# Patient Record
Sex: MALE | Race: ASIAN | NOT HISPANIC OR LATINO | Employment: FULL TIME | URBAN - METROPOLITAN AREA
[De-identification: names, ages, dates, MRNs, and addresses within clinical notes are randomized per-mention and may not be internally consistent; named-entity substitution may affect disease eponyms.]

---

## 2017-03-01 ENCOUNTER — PRE VISIT (OUTPATIENT)
Dept: PULMONOLOGY | Facility: CLINIC | Age: 47
End: 2017-03-01

## 2017-03-01 NOTE — TELEPHONE ENCOUNTER
1.  Date/reason for appt: 3/13/17- Allergies     2.  Referring provider: Self     3.  Call to patient (Yes / No - short description): Yes, LM to call back. Any outside records related?

## 2017-03-01 NOTE — TELEPHONE ENCOUNTER
Received call back from patient. Stated no outside records related to allergies. This is the first time being seen for it.     Closing encounter.

## 2017-03-07 ENCOUNTER — CARE COORDINATION (OUTPATIENT)
Dept: PULMONOLOGY | Facility: CLINIC | Age: 47
End: 2017-03-07

## 2017-03-07 NOTE — PROGRESS NOTES
Writer contacted patient to remind him to hold any antihistamines prior to next week's appointment with Dr. Wilson. Patient verbalized understanding of this information and stated he does not take any antihistamines currently.

## 2017-03-13 ENCOUNTER — OFFICE VISIT (OUTPATIENT)
Dept: PULMONOLOGY | Facility: CLINIC | Age: 47
End: 2017-03-13
Attending: ALLERGY & IMMUNOLOGY
Payer: COMMERCIAL

## 2017-03-13 VITALS — OXYGEN SATURATION: 98 % | DIASTOLIC BLOOD PRESSURE: 87 MMHG | HEART RATE: 66 BPM | SYSTOLIC BLOOD PRESSURE: 133 MMHG

## 2017-03-13 DIAGNOSIS — J30.1 SEASONAL ALLERGIC RHINITIS DUE TO POLLEN: Primary | ICD-10-CM

## 2017-03-13 DIAGNOSIS — J30.81 NON-SEASONAL ALLERGIC RHINITIS DUE TO ANIMAL HAIR AND DANDER: ICD-10-CM

## 2017-03-13 PROCEDURE — 95018 ALL TSTG PERQ&IQ DRUGS/BIOL: CPT | Mod: ZF

## 2017-03-13 PROCEDURE — 99212 OFFICE O/P EST SF 10 MIN: CPT | Mod: ZF

## 2017-03-13 PROCEDURE — 95004 PERQ TESTS W/ALRGNC XTRCS: CPT | Performed by: ALLERGY & IMMUNOLOGY

## 2017-03-13 PROCEDURE — 95024 IQ TESTS W/ALLERGENIC XTRCS: CPT | Performed by: ALLERGY & IMMUNOLOGY

## 2017-03-13 RX ORDER — FLUTICASONE PROPIONATE 50 MCG
2 SPRAY, SUSPENSION (ML) NASAL DAILY
Qty: 1 BOTTLE | Refills: 3 | Status: SHIPPED | OUTPATIENT
Start: 2017-03-13 | End: 2021-11-18

## 2017-03-13 RX ORDER — OLOPATADINE HYDROCHLORIDE 2 MG/ML
1 SOLUTION/ DROPS OPHTHALMIC DAILY
Qty: 2.5 ML | Refills: 3 | Status: SHIPPED | OUTPATIENT
Start: 2017-03-13 | End: 2021-07-06

## 2017-03-13 ASSESSMENT — PAIN SCALES - GENERAL: PAINLEVEL: NO PAIN (0)

## 2017-03-13 NOTE — LETTER
3/13/2017       RE: Ricky Varela  [address]  [facility] [address]     Dear Colleague,    Thank you for referring your patient, Ricky Varela, to the [facility] at [facility]. Please see a copy of my visit note below.    Reason for Visit  Ricky Varela is a 46 year old male who is referred by Carlos Azevedo for conjunctivitis.    Allergy HPI  HISTORY OF PRESENT ILLNESS:  Ricky presents today for initial consultation.  Main concern is constantly itchy eyes bilaterally.  He said it is extremely irritating for the last few months.  He has tried Claritin-D, does not seem to help very much.  He has not tried any eyedrops, except for he did think maybe it was infected and he used antibiotic eyedrops and thinks maybe it helped.  In the spring and fall, he has mild sneezing and nasal congestion which he used Claritin-D with benefit.  He, of note, is a  and he works around mice and rats.  He also did get a dog in November.  He does not feel like the dog is a trigger but the timing is line.      SOCIAL HISTORY:  He is not a smoker.        FAMILY HISTORY:  No allergies.         The patient was seen and examined by Dwight Das MD   Current Outpatient Prescriptions   Medication     gemfibrozil (LOPID) 600 MG tablet     omega-3 acid ethyl esters (LOVAZA) 1 G capsule     ATORVASTATIN CALCIUM PO     No current facility-administered medications for this visit.      Allergies   Allergen Reactions     Atorvastatin Muscle Pain (Myalgia)     Ragweeds Other (See Comments)     rhinitis     Social History     Social History     Marital status:      Spouse name: N/A     Number of children: N/A     Years of education: N/A     Occupational History     Not on file.     Social History Main Topics     Smoking status: Never Smoker     Smokeless tobacco: Never Used     Alcohol use No     Drug use: No     Sexual activity: Not on file      Other Topics Concern     Not on file     Social History Narrative    Lives in [facility], with wife and children.   2007, two children (2008, 09).  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          From [place], immigrated in 1986.  Family here.  5 siblings     Past Medical History   Diagnosis Date     Alopecia areata      recurrent     Hyperlipidemia LDL goal < 160      Past Surgical History   Procedure Laterality Date     Vasectomy       Circumcision  7/8/2014     Procedure: CIRCUMCISION;  Surgeon: Tomas Francisco MD;  Location: [facility]     Colonoscopy  2015     NORMAL, repeat in 2020     Family History   Problem Relation Age of Onset     DIABETES Mother      Cancer - colorectal Father 64     DIABETES Brother          ROS   A complete ROS was otherwise negative except as noted in the HPI and the end of the note.  /87 (BP Location: Right arm, Patient Position: Chair, Cuff Size: Adult Regular)  Pulse 66  SpO2 98%  Exam:   GENERAL APPEARANCE: Well developed, well nourished, alert, and in no apparent distress.  EYES: PERRL, EOMI, conjunctiva clear non-injected.  Redness around the eyelids.  HENT: Nasal mucosa with mild edema and no discharge. No nasal polyps.    EARS: Canals clear, TMs normal  MOUTH: Oral mucosa is moist, without any lesions, no tonsillar enlargement, no oropharyngeal exudate.  LYMPHATICS: No significant cervical, or supraclavicular nodes.  RESP: Good air flow throughout.  No crackles. No rhonchi. No wheezes.  CV: Normal S1, S2, regular rhythm, normal rate. No murmur.  No rub. No gallop. No LE edema.   MS: Extremities normal. No clubbing. No cyanosis.  SKIN: No rashes noted  NEURO: Speech normal, normal strength and tone, normal gait and stance  PSYCH: Normal mentation, orientation to person, place, and time.  Results:  39 percutaneous environmental allergen extracts placed by MA and read by MD.  High positive to tree and weed pollen and mild to grass pollen.    3  intradermal allergen extracts placed and positive to dog.        Assessment and plan: ASSESSMENT AND PLAN:   Ricky presented today for an initial consultation regarding concern of mostly itchy eyes.  He also has symptoms in the spring and fall.  IgE mediated skin testing did show he does have a positive sensitivity to dog and this is in line with his symptoms being mostly noted the time the dog started.  They cannot get rid of the dog, so we discussed control measures and I recommended him using Pataday eyedrops but if he cannot get this for insurance reasons, he will get Zaditor over-the-counter.  Because of the spring and fall, will use Flonase.  We might be able to stop this after the fall and 10 mg cetirizine in the spring and fall, may need to use this more frequently depending on how he does with the eyedrops as well.  Asked him to follow up in about 3 months and then we will discuss desensitization.  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       Again, thank you for allowing me to participate in the care of your patient.      Sincerely,    Dwight Das MD

## 2017-03-13 NOTE — MR AVS SNAPSHOT
After Visit Summary   3/13/2017    Ricky Varela    MRN: [id number]           Patient Information     Date Of Birth          1970        Visit Information        Provider Department      3/13/2017 8:15 AM Dwight Wilson MD [facility]        Today's Diagnoses     Seasonal allergic rhinitis due to pollen    -  1      Care Instructions    Zaditor eye drop 1 drop in each eye twice a day if Pataday is very expensive.    Fluticasone (flonase) nasal spray 2 sprays in each nostril.    10 mg zyrtec (ceterizine) can be used in the spring and fall.      Animal Allergy  (FELd1)    The number of pets in the [place] is estimated at more than 150 million. This large number also increases the likelihood of accidental exposure to animals by the allergic patient when visiting homes and public places.    Ways to reduce reactions/complications:  If you can keep the dog out of the bedroom get a HEPA air filter in the bedroom    The ideal situation for an allergic reaction would be to not have any pet at all. However, many pet owners feel strongly about their pets, and would rather remove the allergic individual from the home than the pet! A pet such as a dog or cat should at least be kept out of the patient's bedroom. Recent studies have shown that bathing a cat or dog once a week can decrease significantly the amount of the allergen that the allergic patient is exposed to. The avid pet owner may claim that exposure to his or her pet does not cause them any problem. This however, should be viewed skeptically since pet ownership is an emotionally charged subject. Also, many allergic pet owners are rarely away from their pets, so an accurate reporting of pet related symptoms may not be possible. The best test to confirm if a person is allergic to a pet is removal from the home for several weeks and a thorough cleaning done by the non-allergic individual to remove the hair  "and dander. It should be understood that it could take weeks for meticulous cleaning to remove all the animal hair and dander before a change in the allergic patient is noticed. A frequently mistaken idea is that shorthaired animals cause fewer problems. It is the dander (flakes of skin) that causes the most significant allergic reactions, not the length of the hair on the pet. Allergens are also found in the pet's saliva and urine. In addition, long haired animals that are outside can then bring outdoors pollens inside and exposing allergic individuals.    Indoor pets should be restricted to a few rooms in the home if possible. Isolating the pet to one room however, will not limit the allergens to that room. Air currents from forced-air heating and air conditioning will spread the allergens throughout the house. Homes with forced-air heating and/or air conditioning may be fitted with a central air . This may remove significant amounts of pet allergens from the home. The air  should be used at least four hours per day.     Best Pets for Allergenic Individuals:    The best types of pets for allergic patients are tropical fish, lizards, turtles, salamanders and certain types of frogs and tortoises. All of these pets do not have hair, fur or dander nor does their excrement create allergic problems. However, patients should keep in mind that large aquariums can add water vapor in a room, thus increasing mold and house dust mite concentrations in their home.         *All information has been reviewed, updated and approved by:  Dr. Dwight Wilson-[facility] at [facility] updated: 11/2016         Pollen Control Measures      * Keep windows and doors shut and run air conditioner at all times. This keeps outdoor air outside.    * Keep windows closed while in the car and air conditioner on the \"re-circulate\" mode.    * Wash your hair before going to bed at night to reduce exposure during " "sleep.    * Keep pets outdoors to prevent the pollen from being brought in on their hair.    * Avoid hanging clothes and linens outside as pollens may collect on the items.     * Don't mow the lawn if you are allergic to grass or weeds. If you must mow the grass, wear a face mask.       Spring: Tree Pollen    Summer: Grass Pollen and Mold    Fall: Weed Pollen and Mold      *All information has been reviewed, updated and approved by:  Dr. Dwight Wilson-[place] [facility] at [facility] updated: 11/2016                Follow-ups after your visit        Follow-up notes from your care team     Return in about 3 months (around 6/13/2017).      Who to contact     If you have questions or need follow up information about today's clinic visit or your schedule please contact [facility] directly at [phone].  Normal or non-critical lab and imaging results will be communicated to you by [employer], letter or phone within 4 business days after the clinic has received the results. If you do not hear from us within 7 days, please contact the clinic through [employer] or phone. If you have a critical or abnormal lab result, we will notify you by phone as soon as possible.  Submit refill requests through [employer] or call your pharmacy and they will forward the refill request to us. Please allow 3 business days for your refill to be completed.          Additional Information About Your Visit        [employer] Information     [employer] lets you send messages to your doctor, view your test results, renew your prescriptions, schedule appointments and more. To sign up, go to [email or web address] . Click on \"Log in\" on the left side of the screen, which will take you to the Welcome page. Then click on \"Sign up Now\" on the right side of the page.     You will be asked to enter the access code listed below, as well as some personal information. Please follow the directions to create your username " and password.     Your access code is: [email or web address]  Expires: 2017  9:37 AM     Your access code will  in 90 days. If you need help or a new code, please call your [facility] or [phone].        Care EveryWhere ID     This is your Care EveryWhere ID. This could be used by other organizations to access your [place] medical records  [id number]        Your Vitals Were     Pulse Pulse Oximetry                66 98%           Blood Pressure from Last 3 Encounters:   17 133/87   16 113/80   16 116/79    Weight from Last 3 Encounters:   16 66.8 kg (147 lb 4.8 oz)   16 68.9 kg (152 lb)   09/24/15 67.1 kg (148 lb)              Today, you had the following     No orders found for display         Today's Medication Changes          These changes are accurate as of: 3/13/17 10:08 AM.  If you have any questions, ask your nurse or doctor.               Start taking these medicines.        Dose/Directions    fluticasone 50 MCG/ACT spray   Commonly known as:  FLONASE   Used for:  Seasonal allergic rhinitis due to pollen   Started by:  Dwight Wilson MD        Dose:  2 spray   Spray 2 sprays into both nostrils daily   Quantity:  1 Bottle   Refills:  3       olopatadine HCl 0.2 % Soln   Commonly known as:  PATADAY   Used for:  Seasonal allergic rhinitis due to pollen   Started by:  Dwight Wilson MD        Dose:  1 drop   Place 1 drop into both eyes daily   Quantity:  2.5 mL   Refills:  3            Where to get your medicines      These medications were sent to [place] [address] [place] [facility] [address]  [address] [facility] [address]     Phone:  [phone]     fluticasone 50 MCG/ACT spray    olopatadine HCl 0.2 % Soln                Primary Care Provider Office Phone # Fax #    Carlos Azevedo [phone]       [facility] [address] [facility] [address]        Thank you!     Thank you for  [facility]  for your care. Our goal is always to provide you with excellent care. Hearing back from our patients is one way we can continue to improve our services. Please take a few minutes to complete the written survey that you may receive in the mail after your visit with us. Thank you!             Your Updated Medication List - Protect others around you: Learn how to safely use, store and throw away your medicines at www.disposemymeds.org.          This list is accurate as of: 3/13/17 10:08 AM.  Always use your most recent med list.                   Brand Name Dispense Instructions for use    ATORVASTATIN CALCIUM PO          fluticasone 50 MCG/ACT spray    FLONASE    1 Bottle    Spray 2 sprays into both nostrils daily       gemfibrozil 600 MG tablet    LOPID    182 tablet    Take 1 tablet (600 mg) by mouth 2 times daily (before meals) , OR as directed by Dr. Mendieta.       olopatadine HCl 0.2 % Soln    PATADAY    2.5 mL    Place 1 drop into both eyes daily       omega-3 acid ethyl esters 1 G capsule    Lovaza    182 capsule    Take 1 capsule (1 g) by mouth 2 times daily

## 2017-03-13 NOTE — NURSING NOTE
Chief Complaint   Patient presents with     Allergy Consult     Patient is vincenzo seen for consultation of allergies      Joana Alegre CMA at 8:33 AM on 3/13/2017

## 2017-03-13 NOTE — PATIENT INSTRUCTIONS
Zaditor eye drop 1 drop in each eye twice a day if Pataday is very expensive.    Fluticasone (flonase) nasal spray 2 sprays in each nostril.    10 mg zyrtec (ceterizine) can be used in the spring and fall.      Animal Allergy  (FELd1)    The number of pets in the [place] is estimated at more than 150 million. This large number also increases the likelihood of accidental exposure to animals by the allergic patient when visiting homes and public places.    Ways to reduce reactions/complications:  If you can keep the dog out of the bedroom get a HEPA air filter in the bedroom    The ideal situation for an allergic reaction would be to not have any pet at all. However, many pet owners feel strongly about their pets, and would rather remove the allergic individual from the home than the pet! A pet such as a dog or cat should at least be kept out of the patient's bedroom. Recent studies have shown that bathing a cat or dog once a week can decrease significantly the amount of the allergen that the allergic patient is exposed to. The avid pet owner may claim that exposure to his or her pet does not cause them any problem. This however, should be viewed skeptically since pet ownership is an emotionally charged subject. Also, many allergic pet owners are rarely away from their pets, so an accurate reporting of pet related symptoms may not be possible. The best test to confirm if a person is allergic to a pet is removal from the home for several weeks and a thorough cleaning done by the non-allergic individual to remove the hair and dander. It should be understood that it could take weeks for meticulous cleaning to remove all the animal hair and dander before a change in the allergic patient is noticed. A frequently mistaken idea is that shorthaired animals cause fewer problems. It is the dander (flakes of skin) that causes the most significant allergic reactions, not the length of the hair on the pet. Allergens are also  "found in the pet's saliva and urine. In addition, long haired animals that are outside can then bring outdoors pollens inside and exposing allergic individuals.    Indoor pets should be restricted to a few rooms in the home if possible. Isolating the pet to one room however, will not limit the allergens to that room. Air currents from forced-air heating and air conditioning will spread the allergens throughout the house. Homes with forced-air heating and/or air conditioning may be fitted with a central air . This may remove significant amounts of pet allergens from the home. The air  should be used at least four hours per day.     Best Pets for Allergenic Individuals:    The best types of pets for allergic patients are tropical fish, lizards, turtles, salamanders and certain types of frogs and tortoises. All of these pets do not have hair, fur or dander nor does their excrement create allergic problems. However, patients should keep in mind that large aquariums can add water vapor in a room, thus increasing mold and house dust mite concentrations in their home.         *All information has been reviewed, updated and approved by:  Dr. Dwight Wilson-[facility] at [facility] updated: 11/2016         Pollen Control Measures      * Keep windows and doors shut and run air conditioner at all times. This keeps outdoor air outside.    * Keep windows closed while in the car and air conditioner on the \"re-circulate\" mode.    * Wash your hair before going to bed at night to reduce exposure during sleep.    * Keep pets outdoors to prevent the pollen from being brought in on their hair.    * Avoid hanging clothes and linens outside as pollens may collect on the items.     * Don't mow the lawn if you are allergic to grass or weeds. If you must mow the grass, wear a face mask.       Spring: Tree Pollen    Summer: Grass Pollen and Mold    Fall: Weed Pollen and Mold      *All information has been " reviewed, updated and approved by:  Dr. Dwight Wilson-[facility] at [facility] updated: 11/2016

## 2017-03-13 NOTE — PROGRESS NOTES
Reason for Visit  Ricky Varela is a 46 year old male who is referred by Carlos Azevedo for conjunctivitis.    Allergy HPI  HISTORY OF PRESENT ILLNESS:  Ricky presents today for initial consultation.  Main concern is constantly itchy eyes bilaterally.  He said it is extremely irritating for the last few months.  He has tried Claritin-D, does not seem to help very much.  He has not tried any eyedrops, except for he did think maybe it was infected and he used antibiotic eyedrops and thinks maybe it helped.  In the spring and fall, he has mild sneezing and nasal congestion which he used Claritin-D with benefit.  He, of note, is a  and he works around mice and rats.  He also did get a dog in November.  He does not feel like the dog is a trigger but the timing is line.      SOCIAL HISTORY:  He is not a smoker.        FAMILY HISTORY:  No allergies.         The patient was seen and examined by Dwight Das MD   Current Outpatient Prescriptions   Medication     gemfibrozil (LOPID) 600 MG tablet     omega-3 acid ethyl esters (LOVAZA) 1 G capsule     ATORVASTATIN CALCIUM PO     No current facility-administered medications for this visit.      Allergies   Allergen Reactions     Atorvastatin Muscle Pain (Myalgia)     Ragweeds Other (See Comments)     rhinitis     Social History     Social History     Marital status:      Spouse name: N/A     Number of children: N/A     Years of education: N/A     Occupational History     Not on file.     Social History Main Topics     Smoking status: Never Smoker     Smokeless tobacco: Never Used     Alcohol use No     Drug use: No     Sexual activity: Not on file     Other Topics Concern     Not on file     Social History Narrative    Lives in [facility], with wife and children.   2007, two children (2008, 09).  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          From [place], immigrated in 1986.  Family here.  5 siblings     Past Medical History    Diagnosis Date     Alopecia areata      recurrent     Hyperlipidemia LDL goal < 160      Past Surgical History   Procedure Laterality Date     Vasectomy       Circumcision  7/8/2014     Procedure: CIRCUMCISION;  Surgeon: Tomas Francisco MD;  Location: [facility]     Colonoscopy  2015     NORMAL, repeat in 2020     Family History   Problem Relation Age of Onset     DIABETES Mother      Cancer - colorectal Father 64     DIABETES Brother          ROS   A complete ROS was otherwise negative except as noted in the HPI and the end of the note.  /87 (BP Location: Right arm, Patient Position: Chair, Cuff Size: Adult Regular)  Pulse 66  SpO2 98%  Exam:   GENERAL APPEARANCE: Well developed, well nourished, alert, and in no apparent distress.  EYES: PERRL, EOMI, conjunctiva clear non-injected.  Redness around the eyelids.  HENT: Nasal mucosa with mild edema and no discharge. No nasal polyps.    EARS: Canals clear, TMs normal  MOUTH: Oral mucosa is moist, without any lesions, no tonsillar enlargement, no oropharyngeal exudate.  LYMPHATICS: No significant cervical, or supraclavicular nodes.  RESP: Good air flow throughout.  No crackles. No rhonchi. No wheezes.  CV: Normal S1, S2, regular rhythm, normal rate. No murmur.  No rub. No gallop. No LE edema.   MS: Extremities normal. No clubbing. No cyanosis.  SKIN: No rashes noted  NEURO: Speech normal, normal strength and tone, normal gait and stance  PSYCH: Normal mentation, orientation to person, place, and time.  Results:  39 percutaneous environmental allergen extracts placed by MA and read by MD.  High positive to tree and weed pollen and mild to grass pollen.    3 intradermal allergen extracts placed and positive to dog.        Assessment and plan: ASSESSMENT AND PLAN:   Ricky presented today for an initial consultation regarding concern of mostly itchy eyes.  He also has symptoms in the spring and fall.  IgE mediated skin testing did show he does have a positive  sensitivity to dog and this is in line with his symptoms being mostly noted the time the dog started.  They cannot get rid of the dog, so we discussed control measures and I recommended him using Pataday eyedrops but if he cannot get this for insurance reasons, he will get Zaditor over-the-counter.  Because of the spring and fall, will use Flonase.  We might be able to stop this after the fall and 10 mg cetirizine in the spring and fall, may need to use this more frequently depending on how he does with the eyedrops as well.  Asked him to follow up in about 3 months and then we will discuss desensitization.  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

## 2017-05-02 ENCOUNTER — OFFICE VISIT (OUTPATIENT)
Dept: FAMILY MEDICINE | Facility: CLINIC | Age: 47
End: 2017-05-02

## 2017-05-02 VITALS
HEIGHT: 65 IN | RESPIRATION RATE: 16 BRPM | HEART RATE: 90 BPM | WEIGHT: 148 LBS | BODY MASS INDEX: 24.66 KG/M2 | DIASTOLIC BLOOD PRESSURE: 79 MMHG | SYSTOLIC BLOOD PRESSURE: 119 MMHG

## 2017-05-02 DIAGNOSIS — E78.00 HIGH BLOOD CHOLESTEROL: ICD-10-CM

## 2017-05-02 DIAGNOSIS — E78.00 HIGH BLOOD CHOLESTEROL: Primary | ICD-10-CM

## 2017-05-02 LAB
ALBUMIN SERPL-MCNC: 3.9 G/DL (ref 3.4–5)
ALP SERPL-CCNC: 61 U/L (ref 40–150)
ALT SERPL W P-5'-P-CCNC: 42 U/L (ref 0–70)
ANION GAP SERPL CALCULATED.3IONS-SCNC: 7 MMOL/L (ref 3–14)
AST SERPL W P-5'-P-CCNC: 29 U/L (ref 0–45)
BILIRUB SERPL-MCNC: 0.4 MG/DL (ref 0.2–1.3)
BUN SERPL-MCNC: 17 MG/DL (ref 7–30)
CALCIUM SERPL-MCNC: 9 MG/DL (ref 8.5–10.1)
CHLORIDE SERPL-SCNC: 107 MMOL/L (ref 94–109)
CHOLEST SERPL-MCNC: 226 MG/DL
CO2 SERPL-SCNC: 27 MMOL/L (ref 20–32)
CREAT SERPL-MCNC: 1.02 MG/DL (ref 0.66–1.25)
GFR SERPL CREATININE-BSD FRML MDRD: 78 ML/MIN/1.7M2
GLUCOSE SERPL-MCNC: 94 MG/DL (ref 70–99)
HDLC SERPL-MCNC: 41 MG/DL
LDLC SERPL CALC-MCNC: 141 MG/DL
NONHDLC SERPL-MCNC: 185 MG/DL
POTASSIUM SERPL-SCNC: 4.7 MMOL/L (ref 3.4–5.3)
PROT SERPL-MCNC: 7.6 G/DL (ref 6.8–8.8)
SODIUM SERPL-SCNC: 141 MMOL/L (ref 133–144)
TRIGL SERPL-MCNC: 219 MG/DL

## 2017-05-02 ASSESSMENT — PAIN SCALES - GENERAL: PAINLEVEL: MILD PAIN (3)

## 2017-05-02 NOTE — PATIENT INSTRUCTIONS
[facility] Care Center Medication Refill Request Information:  * Please contact your pharmacy regarding ANY request for medication refills.  ** [facility] Prescription Fax = [phone]  * Please allow 3 business days for routine medication refills.  * Please allow 5 business days for controlled substance medication refills.     [facility] Care Center Test Result notification information:  *You will be notified within 7-10 days of your appointment day regarding the results of your test.  If you are on MyChart you will be notified as soon as the provider has reviewed the results and signed off on them.

## 2017-05-02 NOTE — PROGRESS NOTES
SUBJECTIVE:    Pt is a 46 year old male with pmh of     Patient Active Problem List   Diagnosis     Lumbago     Hyperlipidemia with target LDL less than 160       who is here for evaluation of had concerns including Physical.     Here for physical. Overall doing well. Takes low dose Lipitor. Tries to eat healthy, exercise routinely. 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    Notes right sinus discomfort and plugged right ear last few weeks, started after trip w/ kids to the [place].     Gets seasonal allergies, also notes cannot tolerate Sudafed. On Flonase, daily, about two weeks, so far no improvement.    Rash, comes goes, base of neck. Red, slightly itchy, occasionally elsewhere, not open, warm, fluid filled, draining, tender. Slight raised non flaky patches.    Allergies   Allergen Reactions     Atorvastatin Muscle Pain (Myalgia)     Ragweeds Other (See Comments)     rhinitis         Past Medical History:   Diagnosis Date     Alopecia areata     recurrent     Hyperlipidemia LDL goal < 160      Past Surgical History:   Procedure Laterality Date     CIRCUMCISION  7/8/2014    Procedure: CIRCUMCISION;  Surgeon: Tomas Francisco MD;  Location: [facility] OR     COLONOSCOPY  2015    NORMAL, repeat in 2020     VASECTOMY       Family History   Problem Relation Age of Onset     DIABETES Mother      Cancer - colorectal Father 64     DIABETES Brother      Allergies   Allergen Reactions     Atorvastatin Muscle Pain (Myalgia)     Ragweeds Other (See Comments)     rhinitis           Current Outpatient Prescriptions   Medication Sig Dispense Refill     ATORVASTATIN CALCIUM PO        olopatadine HCl (PATADAY) 0.2 % SOLN Place 1 drop into both eyes daily 2.5 mL 3     fluticasone (FLONASE) 50 MCG/ACT spray Spray 2 sprays into both nostrils daily 1 Bottle 3     gemfibrozil (LOPID) 600 MG tablet Take 1 tablet (600 mg) by mouth 2 times daily (before meals) , OR as directed by Dr. Mendieta. 182 tablet 3  "    omega-3 acid ethyl esters (LOVAZA) 1 G capsule Take 1 capsule (1 g) by mouth 2 times daily 182 capsule 3       Social History   Substance Use Topics     Smoking status: Never Smoker     Smokeless tobacco: Never Used     Alcohol use No         OBJECTIVE:  /79  Pulse 90  Resp 16  Ht 1.654 m (5' 5.12\")  Wt 67.1 kg (148 lb)  BMI 24.54 kg/m2  GENERAL APPEARANCE: Alert, no acute distress  EYES: PERRL, EOM normal, conjunctiva and lids normal  HENT: Ears and TMs normal, oral mucosa and posterior oropharynx normal  NECK: No adenopathy,masses or thyromegaly  RESP: lungs clear to auscultation   CV: normal rate, regular rhythm, no murmur or gallop  ABDOMEN: soft, no organomegaly, masses or tenderness   (male): penis, scrotum, testes normal, no hernias  LYMPHATICS: No cervical, supraclavicular or inguinal adenopathy  MS: extremities normal, no peripheral edema  SKIN: Bilat base of neck laterally one inch red slight raised patch, not flaky, not warm/tender, no open/fluid filled  NEURO: Alert, oriented, speech and mentation normal  PSYCHE: mentation appears normal, affect and mood normal    ASSESSMENT/PLAN:    Rash: possible eczema, he's using OTC lotion, could try hct OTC he knows  High chol; labs done, report sent to him, cont Lipitor from heart clinic  Seasonal allergies, likely right ETD too: cont Flonase, try Claritin and phenylephrine, f/u prn; recent allergy clinic note rvwd      RUSTY LOYD MD    "

## 2017-05-02 NOTE — NURSING NOTE
Chief Complaint   Patient presents with     Physical     Pt is here for a physical.      Jaja Edmonds LPN May 2, 2017 9:25 AM

## 2017-05-02 NOTE — MR AVS SNAPSHOT
After Visit Summary   5/2/2017    Ricky Varela    MRN: [id number]           Patient Information     Date Of Birth          1970        Visit Information        Provider Department      5/2/2017 9:30 AM Rajendra Orozco MD [facility]        Today's Diagnoses     High blood cholesterol    -  1      Care Instructions    Primary Care Center Medication Refill Request Information:  * Please contact your pharmacy regarding ANY request for medication refills.  ** [facility] Prescription Fax = [phone]  * Please allow 3 business days for routine medication refills.  * Please allow 5 business days for controlled substance medication refills.     [facility] Center Test Result notification information:  *You will be notified within 7-10 days of your appointment day regarding the results of your test.  If you are on MyChart you will be notified as soon as the provider has reviewed the results and signed off on them.          Follow-ups after your visit        Your next 10 appointments already scheduled     May 02, 2017 10:30 AM CDT   LAB with [facility] ([facility])    [address] [place] [id number] [phone]           Patient must bring picture ID.  Patient should be prepared to give a urine specimen  Please do not eat 10-12 hours before your appointment if you are coming in fasting for labs on lipids, cholesterol, or glucose (sugar).  Pregnant women should follow their Care Team instructions. Water with medications is okay. Do not drink coffee or other fluids.   If you have concerns about taking  your medications, please ask at office or if scheduling via [place] [employer], send a message by clicking on Secure Messaging, Message Your Care Team.              Future tests that were ordered for you today     Open Future Orders        Priority Expected Expires Ordered    Lipid panel reflex to direct LDL Routine 5/2/2017 5/16/2017  "2017    Comprehensive metabolic panel Routine 2017            Who to contact     Please call your clinic at [phone] to:    Ask questions about your health    Make or cancel appointments    Discuss your medicines    Learn about your test results    Speak to your doctor   If you have compliments or concerns about an experience at your clinic, or if you wish to file a complaint, please contact [facility] Patient Relations at [phone] or email us at [email or web address]         Additional Information About Your Visit        FilmMe Information     FilmMe is an electronic gateway that provides easy, online access to your medical records. With FilmMe, you can request a clinic appointment, read your test results, renew a prescription or communicate with your care team.     To sign up for FilmMe visit the website at [email or web address]   You will be asked to enter the access code listed below, as well as some personal information. Please follow the directions to create your username and password.     Your access code is: [email or web address]  Expires: 2017  9:37 AM     Your access code will  in 90 days. If you need help or a new code, please contact your [facility] Clinic or call [phone] for assistance.        Care EveryWhere ID     This is your Care EveryWhere ID. This could be used by other organizations to access your [facility] medical records  [id number]        Your Vitals Were     Pulse Respirations Height BMI (Body Mass Index)          90 16 1.654 m (5' 5.12\") 24.54 kg/m2         Blood Pressure from Last 3 Encounters:   17 119/79   17 133/87   16 113/80    Weight from Last 3 Encounters:   17 67.1 kg (148 lb)   16 66.8 kg (147 lb 4.8 oz)   16 68.9 kg (152 lb)               Primary Care Provider Office Phone # Fax #    Carlos Azevedo [phone]    "    [facility]  [facility] [address]        Thank you!     Thank you for choosing [facility]  for your care. Our goal is always to provide you with excellent care. Hearing back from our patients is one way we can continue to improve our services. Please take a few minutes to complete the written survey that you may receive in the mail after your visit with us. Thank you!             Your Updated Medication List - Protect others around you: Learn how to safely use, store and throw away your medicines at www.disposemymeds.org.          This list is accurate as of: 5/2/17 10:22 AM.  Always use your most recent med list.                   Brand Name Dispense Instructions for use    ATORVASTATIN CALCIUM PO          fluticasone 50 MCG/ACT spray    FLONASE    1 Bottle    Spray 2 sprays into both nostrils daily       gemfibrozil 600 MG tablet    LOPID    182 tablet    Take 1 tablet (600 mg) by mouth 2 times daily (before meals) , OR as directed by Dr. Mendieta.       olopatadine HCl 0.2 % Soln    PATADAY    2.5 mL    Place 1 drop into both eyes daily       omega-3 acid ethyl esters 1 G capsule    Lovaza    182 capsule    Take 1 capsule (1 g) by mouth 2 times daily

## 2020-11-08 ENCOUNTER — HEALTH MAINTENANCE LETTER (OUTPATIENT)
Age: 50
End: 2020-11-08

## 2020-12-09 ENCOUNTER — VIRTUAL VISIT (OUTPATIENT)
Dept: PSYCHOLOGY | Facility: CLINIC | Age: 50
End: 2020-12-09
Payer: COMMERCIAL

## 2020-12-09 DIAGNOSIS — F43.22 ADJUSTMENT DISORDER WITH ANXIETY: Primary | ICD-10-CM

## 2020-12-09 PROCEDURE — 90847 FAMILY PSYTX W/PT 50 MIN: CPT | Mod: 95 | Performed by: PSYCHOLOGIST

## 2020-12-09 ASSESSMENT — COLUMBIA-SUICIDE SEVERITY RATING SCALE - C-SSRS
TOTAL  NUMBER OF INTERRUPTED ATTEMPTS PAST 3 MONTHS: NO
1. IN THE PAST MONTH, HAVE YOU WISHED YOU WERE DEAD OR WISHED YOU COULD GO TO SLEEP AND NOT WAKE UP?: NO
2. HAVE YOU ACTUALLY HAD ANY THOUGHTS OF KILLING YOURSELF?: NO
ATTEMPT PAST THREE MONTHS: NO
1. IN THE PAST MONTH, HAVE YOU WISHED YOU WERE DEAD OR WISHED YOU COULD GO TO SLEEP AND NOT WAKE UP?: NO
4. HAVE YOU HAD THESE THOUGHTS AND HAD SOME INTENTION OF ACTING ON THEM?: NO
2. HAVE YOU ACTUALLY HAD ANY THOUGHTS OF KILLING YOURSELF LIFETIME?: NO
TOTAL  NUMBER OF INTERRUPTED ATTEMPTS LIFETIME: NO
5. HAVE YOU STARTED TO WORK OUT OR WORKED OUT THE DETAILS OF HOW TO KILL YOURSELF? DO YOU INTEND TO CARRY OUT THIS PLAN?: NO
TOTAL  NUMBER OF ABORTED OR SELF INTERRUPTED ATTEMPTS PAST 3 MONTHS: NO
4. HAVE YOU HAD THESE THOUGHTS AND HAD SOME INTENTION OF ACTING ON THEM?: NO
5. HAVE YOU STARTED TO WORK OUT OR WORKED OUT THE DETAILS OF HOW TO KILL YOURSELF? DO YOU INTEND TO CARRY OUT THIS PLAN?: NO
6. HAVE YOU EVER DONE ANYTHING, STARTED TO DO ANYTHING, OR PREPARED TO DO ANYTHING TO END YOUR LIFE?: NO
ATTEMPT LIFETIME: NO
6. HAVE YOU EVER DONE ANYTHING, STARTED TO DO ANYTHING, OR PREPARED TO DO ANYTHING TO END YOUR LIFE?: NO
TOTAL  NUMBER OF ABORTED OR SELF INTERRUPTED ATTEMPTS PAST LIFETIME: NO
3. HAVE YOU BEEN THINKING ABOUT HOW YOU MIGHT KILL YOURSELF?: NO

## 2020-12-09 NOTE — PROGRESS NOTES
"                 Progress Note - Initial Visit    Client Name:  Ricky Varela Date: 12/9/2020       Service Type: Family with client present     Visit Start Time: 10:35  Visit End Time: 11:25    Visit #: 1    Attendees: Client and Spouse / Significant Other    Service Modality:  Phone Visit:    The patient has been notified of the following:      \"We have found that certain health care needs can be provided without the need for a face to face visit.  This service lets us provide the care you need with a phone conversation.       I will have full access to your [place] medical record during this entire phone call.   I will be taking notes for your medical record.      Since this is like an office visit, we will bill your insurance company for this service.       There are potential benefits and risks of telephone visits (e.g. limits to patient confidentiality) that differ from in-person visits.?  Confidentiality still applies for telephone services, and nobody will record the visit.  It is important to be in a quiet, private space that is free of distractions (including cell phone or other devices) during the visit.??      If during the course of the call I believe a telephone visit is not appropriate, you will not be charged for this service\"     Consent has been obtained for this service by care team member: Yes        DATA:   Interactive Complexity: No   Crisis: No     Presenting Concerns/  Current Stressors:   Lack of intimacy      ASSESSMENT:  Mental Status Assessment:  Appearance:   not visible. on phone   Eye Contact:   not visible. on phone   Psychomotor Behavior: visible. on phone    Attitude:   Cooperative  Interested Friendly Pleasant  Orientation:   All  Speech   Rate / Production: Normal/ Responsive   Volume:  Normal   Mood:    discouraged  Affect:    Appropriate   Thought Content:  Clear   Thought Form:  Coherent  Logical   Insight:    Good       Safety Issues and Plan for Safety and Risk " Management:     [place] Suicide Severity Rating Scale (Lifetime/Recent)  [place] Suicide Severity Rating (Lifetime/Recent) 12/9/2020   1. Wish to be Dead (Lifetime) No   1. Wish to be Dead (Recent) No   2. Non-Specific Active Suicidal Thoughts (Lifetime) No   2. Non-Specific Active Suicidal Thoughts (Recent) No   3. Active Suicidal Ideation with any Methods (Not Plan) Without Intent to Act (Lifetime) No   3. Active Sucidal Ideation with any Methods (Not Plan) Without Intent to Act (Recent) No   4. Active Suicidal Ideation with Some Intent to Act, Without Specific Plan (Lifetime) No   4. Active Suicidal Ideation with Some Intent to Act, Without Specific Plan (Recent) No   5. Active Suicidal Ideation with Specific Plan and Intent (Lifetime) No   5. Active Suicidal Ideation with Specific Plan and Intent (Recent) No   Most Severe Ideation Rating (Lifetime) NA   Frequency (Lifetime) NA   Duration (Lifetime) NA   Controllability (Lifetime) NA   Protective Factors  (Lifetime) NA   Reasons for Ideation (Lifetime) NA   Most Severe Ideation Rating (Past Month) NA   Frequency (Past Month) NA   Duration (Past Month) NA   Controllability (Past Month) NA   Protective Factors (Past Month) NA   Reasons for Ideation (Past Month) NA   Actual Attempt (Lifetime) No   Actual Attempt (Past 3 Months) No   Has subject engaged in non-suicidal self-injurious behavior? (Lifetime) No   Has subject engaged in non-suicidal self-injurious behavior? (Past 3 Months) No   Interrupted Attempts (Lifetime) No   Interrupted Attempts (Past 3 Months) No   Aborted or Self-Interrupted Attempt (Lifetime) No   Aborted or Self-Interrupted Attempt (Past 3 Months) No   Preparatory Acts or Behavior (Lifetime) No   Preparatory Acts or Behavior (Past 3 Months) No   Most Recent Attempt Actual Lethality Code NA   Most Lethal Attempt Actual Lethality Code NA   Initial/First Attempt Actual Lethality Code NA     Patient denies current fears or concerns for personal  safety.  Patient denies current or recent suicidal ideation or behaviors.  Patient denies current or recent homicidal ideation or behaviors.  Patient denies current or recent self injurious behavior or ideation.  Patient denies other safety concerns.  Recommended that patient call 911 or go to the local ED should there be a change in any of these risk factors.  Patient reports there are no firearms in the house.     Diagnostic Criteria:  A. The development of emotional or behavioral symptoms in response to an identifiable stressor(s) occurring within 3 months of the onset of the stressor(s)  B. These symptoms or behaviors are clinically significant, as evidenced by one or both of the following:  C. The stress-related disturbance does not meet criteria for another disorder & is not not an exacerbation of another mental disorder  D. The symptoms do not represent normal bereavement  E. Once the stressor or its consequences have terminated, the symptoms do not persist for more than an additional 6 months       * Adjustment Disorder with Anxiety: The predominant manfestations are symptoms such as nervousness, worry, or jitteriness, or, in children separation anxiety from major attachment figures      DSM5 Diagnoses: (Sustained by DSM5 Criteria Listed Above)  Diagnoses: Adjustment Disorders  309.24 (F43.22) With anxiety  Psychosocial & Contextual Factors: lack of intimacy, covid  WHODAS 2.0 (12 item):   WHODAS 2.0 Total Score 12/2/2020   Total Score MyChart 15   Some encounter information is confidential and restricted. Go to Review Flowsheets activity to see all data.     Intervention:   DBT- Patient was educated on the Interpersonal Effectiveness Skill How to build connection, MI-  Patient was educated on following motivational interviewing skill   and Educated on treatment planning and started identifying goals and interventions for treatment plan  Collateral Reports Completed:  Routed note to PCP      PLAN: (Homework,  other):  1. Provider will continue Diagnostic Assessment.  Patient was given the following to do until next session:  Have date nights. List of what to do and what the other did. Read Sarabjit donohue    2. Provider recommended the following referrals: Sarabjit donohue.      3.  Safety plan created.  Provider recommended that patient - not needed       Juana Flores, WAI  December 9, 2020

## 2020-12-16 ENCOUNTER — VIRTUAL VISIT (OUTPATIENT)
Dept: PSYCHOLOGY | Facility: CLINIC | Age: 50
End: 2020-12-16
Payer: COMMERCIAL

## 2020-12-16 DIAGNOSIS — F43.22 ADJUSTMENT DISORDER WITH ANXIETY: Primary | ICD-10-CM

## 2020-12-16 PROCEDURE — 90791 PSYCH DIAGNOSTIC EVALUATION: CPT | Mod: 95 | Performed by: PSYCHOLOGIST

## 2021-01-04 ENCOUNTER — VIRTUAL VISIT (OUTPATIENT)
Dept: PSYCHOLOGY | Facility: CLINIC | Age: 51
End: 2021-01-04
Payer: COMMERCIAL

## 2021-01-04 DIAGNOSIS — F43.22 ADJUSTMENT DISORDER WITH ANXIETY: Primary | ICD-10-CM

## 2021-01-04 PROCEDURE — 90847 FAMILY PSYTX W/PT 50 MIN: CPT | Mod: 95 | Performed by: PSYCHOLOGIST

## 2021-01-04 NOTE — PROGRESS NOTES
"                                           Progress Note    Patient Name: Ricky Varela  Date: 1/4/2021       Service Type: Family with client present      Session Start Time: 9:03  Session End Time: 9:55     Session Length: 52 min    Session #: 3    Attendees: Client and Spouse / Significant Other    Service Modality:  Phone Visit:    The patient has been notified of the following:      \"We have found that certain health care needs can be provided without the need for a face to face visit.  This service lets us provide the care you need with a phone conversation.       I will have full access to your [place] medical record during this entire phone call.   I will be taking notes for your medical record.      Since this is like an office visit, we will bill your insurance company for this service.       There are potential benefits and risks of telephone visits (e.g. limits to patient confidentiality) that differ from in-person visits.?  Confidentiality still applies for telephone services, and nobody will record the visit.  It is important to be in a quiet, private space that is free of distractions (including cell phone or other devices) during the visit.??      If during the course of the call I believe a telephone visit is not appropriate, you will not be charged for this service\"     Consent has been obtained for this service by care team member: Yes      Treatment Plan Last Reviewed: today  PHQ-9 / MARILEE-7 : 12/09/20    DATA  Interactive Complexity: No  Crisis: No       Progress Since Last Session (Related to Symptoms / Goals / Homework):   Symptoms: Improving more kindness and hugs    Homework: Partially completed  not 10 min touch      Episode of Care Goals: Satisfactory progress - ACTION (Actively working towards change); Intervened by reinforcing change plan / affirming steps taken     Current / Ongoing Stressors and Concerns:   Daughter suicidal and gender questioning   Marriage stress and disconnect   Covid " stress     Treatment Objective(s) Addressed in This Session:   participate in affection activities to improve mood  use relaxation strategies 2 times per day to reduce the physical symptoms of anxiety  track and record at least weekly pleasant exchanges with wife     Intervention:   CBT: reduce awkwardness  Emotion Focused Therapy: build physical connection and appreciations  Interpersonal Therapy: had then touch hands and face  Solution Focused: How to handle daughter's suicidal thoughts and gender problems        ASSESSMENT: Current Emotional / Mental Status (status of significant symptoms):   Risk status (Self / Other harm or suicidal ideation)   Patient denies current fears or concerns for personal safety.   Patient denies current or recent suicidal ideation or behaviors.   Patient denies current or recent homicidal ideation or behaviors.   Patient denies current or recent self injurious behavior or ideation.   Patient denies other safety concerns.   Patient reports there has been no change in risk factors since their last session.     Patient reports there has been no change in protective factors since their last session.     Recommended that patient call 911 or go to the local ED should there be a change in any of these risk factors.     Appearance:                            not visible. on phone   Eye Contact:                           not visible. on phone   Psychomotor Behavior:          visible. on phone    Attitude:                                   Cooperative  Interested Friendly Pleasant  Orientation:                             All  Speech              Rate / Production:       Normal/ Responsive              Volume:                       Normal   Mood:                                      discouraged  Affect:                                      Appropriate   Thought Content:                    Clear   Thought Form:                        Coherent  Logical   Insight:                                      Good      Medication Review:   No current psychiatric medications prescribed     Medication Compliance:   NA     Changes in Health Issues:   None reported     Chemical Use Review:   Substance Use: Chemical use reviewed, no active concerns identified      Tobacco Use: No current tobacco use.      Diagnosis:  1. Adjustment disorder with anxiety      Collateral Reports Completed:   Not Applicable    PLAN: (Patient Tasks / Therapist Tasks / Other)  10 min of touch and tenderness.  Spontaneous hugs and read the book: Sarabjit Flores, LP                                                         ______________________________________________________________________    Treatment Plan    Patient's Name: Ricky Varela  YOB: 1970    Date: 1/4/2021    DSM5 Diagnoses:  Adjustment Disorders  309.24 (F43.22) With anxiety  Psychosocial & Contextual Factors: lack of intimacy, covid  WHODAS 2.0 (12 item):   WHODAS 2.0 Total Score 12/2/2020   Total Score MyChart 15       Referral / Collaboration:  Referral to another professional/service is not indicated at this time..    Anticipated number of session or this episode of care: 10      MeasurableTreatment Goal(s) related to diagnosis / functional impairment(s)  Goal 1: Patient will report more closeness and less stress.    I will know I've met my goal when their is more connection.      Objective #A (Patient Action)    Patient will participate in affection activities to improve mood  use relaxation strategies 2 times per day to reduce the physical symptoms of anxiety  Increase interest, engagement, and pleasure in doing things  track and record at least 2 pleasant exchanges with wife.  Status: New - Date: 1/4/21     Intervention(s)  Therapist will assign homework affection trading times  provide facilitate closeness.      Goal 2: Patient will less stressed with daughter's suicidal thoughts and gender stress.    I will know I've met my goal when know how to  "support my daughter.      Objective #A (Patient Action)    Status: New - Date: 1/4/21     Patient will identify 2 strategies to more effectively address stressors  use \"worry time\" each day for 15 minutes of scheduled worry and then defer obsessive or anxious thinking until the next structured worry time  track and record at least 2 pleasant exchanges with daughter.    Intervention(s)  Therapist will assign homework how to support daughter  role-play communication with daughter.    Patient has reviewed and agreed to the above plan.      Juana Flores, WAI  January 4, 2021      "

## 2021-01-13 ENCOUNTER — VIRTUAL VISIT (OUTPATIENT)
Dept: PSYCHOLOGY | Facility: CLINIC | Age: 51
End: 2021-01-13
Payer: COMMERCIAL

## 2021-01-13 DIAGNOSIS — F43.22 ADJUSTMENT DISORDER WITH ANXIETY: Primary | ICD-10-CM

## 2021-01-13 PROCEDURE — 90834 PSYTX W PT 45 MINUTES: CPT | Mod: 95 | Performed by: PSYCHOLOGIST

## 2021-01-13 NOTE — PROGRESS NOTES
"                                           Progress Note    Patient Name: Ricky Varela  Date: 1/13/2021     Service Type: Individual      Session Start Time: 8:32  Session End Time: 9:24     Session Length: 52 min    Session #: 4    Attendees: Client and Spouse / Significant Other    Service Modality:  Phone Visit:      Provider verified identity through the following two step process.  Patient provided:  Patient photo    The patient has been notified of the following:      \"We have found that certain health care needs can be provided without the need for a face to face visit.  This service lets us provide the care you need with a phone conversation.       I will have full access to your [place] medical record during this entire phone call.   I will be taking notes for your medical record.      Since this is like an office visit, we will bill your insurance company for this service.       There are potential benefits and risks of telephone visits (e.g. limits to patient confidentiality) that differ from in-person visits.?  Confidentiality still applies for telephone services, and nobody will record the visit.  It is important to be in a quiet, private space that is free of distractions (including cell phone or other devices) during the visit.??      If during the course of the call I believe a telephone visit is not appropriate, you will not be charged for this service\"     Consent has been obtained for this service by care team member: Yes      Treatment Plan Last Reviewed: 1/4/21  PHQ-9 / MARILEE-7 : 12/09/20    DATA  Interactive Complexity: No  Crisis: No       Progress Since Last Session (Related to Symptoms / Goals / Homework):   Symptoms: Worsening Ricky reports his attempts to connect only happened once. Is discouraged    Homework: Partially completed      Episode of Care Goals: Minimal progress - ACTION (Actively working towards change); Intervened by reinforcing change plan / affirming steps taken     Current / " Ongoing Stressors and Concerns:   Daughter suicidal and gender questioning              Marriage stress and disconnect              Covid stress     Treatment Objective(s) Addressed in This Session:   participate in building positive relationship activities to improve mood  identify at least 2 techniques for intervening on the escalation  initiate physical social contact(s) per day for increasing lengths of time  Decrease frequency and intensity of feeling down, depressed, hopeless  Identify negative self-talk and behaviors: challenge core beliefs, myths, and actions  track and record at least weekly pleasant exchanges with wife  Disagreements and money conflict     Intervention:   CBT: reduce expectations of no problems  Emotion Focused Therapy: how to connect emotionally  Interpersonal Therapy: communication                ASSESSMENT: Current Emotional / Mental Status (status of significant symptoms):              Risk status (Self / Other harm or suicidal ideation)              Patient denies current fears or concerns for personal safety.              Patient denies current or recent suicidal ideation or behaviors.              Patient denies current or recent homicidal ideation or behaviors.              Patient denies current or recent self injurious behavior or ideation.              Patient denies other safety concerns.              Patient reports there has been no change in risk factors since their last session.                Patient reports there has been no change in protective factors since their last session.                Recommended that patient call 911 or go to the local ED should there be a change in any of these risk factors.                 Appearance:                            not visible. on phone   Eye Contact:                           not visible. on phone   Psychomotor Behavior:          visible. on phone    Attitude:                                   Cooperative  Interested Friendly  Pleasant  Orientation:                             All  Speech              Rate / Production:       Normal/ Responsive              Volume:                       Normal   Mood:                                      discouraged  Affect:                                      Appropriate   Thought Content:                    Clear   Thought Form:                        Coherent  Logical   Insight:                                     Good                  Medication Review:              No current psychiatric medications prescribed                 Medication Compliance:              NA                 Changes in Health Issues:              None reported                 Chemical Use Review:              Substance Use: Chemical use reviewed, no active concerns identified                  Tobacco Use: No current tobacco use.       Diagnosis:  1. Adjustment disorder with anxiety       Collateral Reports Completed:              Not Applicable     PLAN: (Patient Tasks / Therapist Tasks / Other)  10 min of touch and tenderness.  Spontaneous hugs and read the book: Sarabjit Flores, LP                                                           ______________________________________________________________________     Treatment Plan     Patient's Name: Ricky Varela                     YOB: 1970     Date: 1/4/2021     DSM5 Diagnoses:  Adjustment Disorders  309.24 (F43.22) With anxiety  Psychosocial & Contextual Factors: lack of intimacy, covid  WHODAS 2.0 (12 item):   WHODAS 2.0 Total Score 12/2/2020   Total Score MyChart 15         Referral / Collaboration:  Referral to another professional/service is not indicated at this time.     Anticipated number of session or this episode of care: 10        MeasurableTreatment Goal(s) related to diagnosis / functional impairment(s)  Goal 1: Patient will report more closeness and less stress.    I will know I've met my goal when their is more connection.   "     Objective #A (Patient Action)                          Patient will participate in affection activities to improve mood  use relaxation strategies 2 times per day to reduce the physical symptoms of anxiety  Increase interest, engagement, and pleasure in doing things  track and record at least 2 pleasant exchanges with wife.  Status: New - Date: 1/4/21      Intervention(s)  Therapist will assign homework affection trading times  provide facilitate closeness.        Goal 2: Patient will less stressed with daughter's suicidal thoughts and gender stress.    I will know I've met my goal when know how to support my daughter.       Objective #A (Patient Action)                          Status: New - Date: 1/4/21      Patient will identify 2 strategies to more effectively address stressors  use \"worry time\" each day for 15 minutes of scheduled worry and then defer obsessive or anxious thinking until the next structured worry time  track and record at least 2 pleasant exchanges with daughter.     Intervention(s)  Therapist will assign homework how to support daughter  role-play communication with daughter.     Patient has reviewed and agreed to the above plan.                January 4, 2021    Juana Flores LP  January 13, 2021    "

## 2021-01-27 ENCOUNTER — VIRTUAL VISIT (OUTPATIENT)
Dept: PSYCHOLOGY | Facility: CLINIC | Age: 51
End: 2021-01-27
Payer: COMMERCIAL

## 2021-01-27 DIAGNOSIS — F43.22 ADJUSTMENT DISORDER WITH ANXIETY: Primary | ICD-10-CM

## 2021-01-27 PROCEDURE — 90847 FAMILY PSYTX W/PT 50 MIN: CPT | Mod: 95 | Performed by: PSYCHOLOGIST

## 2021-01-27 NOTE — PROGRESS NOTES
Progress Note    Patient Name: Ricky Varela  Date: 1/27/2021       Service Type: Individual      Session Start Time: 8:30  Session End Time: 9:22     Session Length: 52    Session #: 5    Attendees:   Client and Spouse / Significant Other     Service Modality: VideoVisit:      Telemedicine Visit: The patient's condition can be safely assessed and treated via synchronous audio and visual telemedicine encounter.      Reason for Telemedicine Visit: Services only offered telehealth    Originating Site (Patient Location): Patient's place of employment    Distant Site (Provider Location): Provider's home office    Consent:  The patient/guardian has verbally consented to: the potential risks and benefits of telemedicine (video visit) versus in person care; bill my insurance or make self-payment for services provided; and responsibility for payment of non-covered services.     Mode of Communication:  Video Conference via [employer]    As the provider I attest to compliance with applicable laws and regulations related to telemedicine.                  Treatment Plan Last Reviewed: 1/4/21  PHQ-9 / MARILEE-7 : 12/09/20     DATA  Interactive Complexity: No  Crisis: No           Progress Since Last Session (Related to Symptoms / Goals / Homework):   Symptoms: Improving more closeness and spending time together    Homework: Partially completed  no touch time yet      Episode of Care Goals: Minimal progress - ACTION (Actively working towards change); Intervened by reinforcing change plan / affirming steps taken     Current / Ongoing Stressors and Concerns:   Daughter suicidal and gender questioning              Marriage stress and disconnect              Covid stress   House renovation     Treatment Objective(s) Addressed in This Session:   participate in more positive communication  activities to improve mood  use relaxation strategies 2 times per day to reduce the physical symptoms of  anxiety  learn & utilize at least meal and physical closeness assertive communication skills weekly     Intervention:   CBT: communication and creating trust in relationship  Emotion Focused Therapy: emotional support  Interpersonal Therapy: building comfortable communication and decision making forum                ASSESSMENT: Current Emotional / Mental Status (status of significant symptoms):              Risk status (Self / Other harm or suicidal ideation)              Patient denies current fears or concerns for personal safety.              Patient denies current or recent suicidal ideation or behaviors.              Patient denies current or recent homicidal ideation or behaviors.              Patient denies current or recent self injurious behavior or ideation.              Patient denies other safety concerns.              Patient reports there has been no change in risk factors since their last session.                Patient reports there has been no change in protective factors since their last session.                Recommended that patient call 911 or go to the local ED should there be a change in any of these risk factors.                 Appearance:                            not visible. on phone   Eye Contact:                           not visible. on phone   Psychomotor Behavior:          visible. on phone    Attitude:                                   Cooperative  Interested Friendly Pleasant  Orientation:                             All  Speech              Rate / Production:       Normal/ Responsive              Volume:                       Normal   Mood:                                      discouraged  Affect:                                      Appropriate   Thought Content:                    Clear   Thought Form:                        Coherent  Logical   Insight:                                     Good                  Medication Review:              No current psychiatric medications  prescribed                 Medication Compliance:              NA                 Changes in Health Issues:              None reported                 Chemical Use Review:              Substance Use: Chemical use reviewed, no active concerns identified                  Tobacco Use: No current tobacco use.       Diagnosis:  1. Adjustment disorder with anxiety       Collateral Reports Completed:              Not Applicable     PLAN: (Patient Tasks / Therapist Tasks / Other)  Sit together and build decision making forum. 10 min of touch and tenderness.  Spontaneous hugs and read the book: Sarabjit Flores, LP                                                           ______________________________________________________________________     Treatment Plan     Patient's Name: Ricky Varela                     YOB: 1970     Date: 1/4/2021     DSM5 Diagnoses:  Adjustment Disorders  309.24 (F43.22) With anxiety  Psychosocial & Contextual Factors: lack of intimacy, covid  WHODAS 2.0 (12 item):   WHODAS 2.0 Total Score 12/2/2020   Total Score MyChart 15         Referral / Collaboration:  Referral to another professional/service is not indicated at this time.     Anticipated number of session or this episode of care: 10        MeasurableTreatment Goal(s) related to diagnosis / functional impairment(s)  Goal 1: Patient will report more closeness and less stress.    I will know I've met my goal when their is more connection.       Objective #A (Patient Action)                          Patient will participate in affection activities to improve mood  use relaxation strategies 2 times per day to reduce the physical symptoms of anxiety  Increase interest, engagement, and pleasure in doing things  track and record at least 2 pleasant exchanges with wife.  Status: New - Date: 1/4/21      Intervention(s)  Therapist will assign homework affection trading times  provide facilitate  "closeness.        Goal 2: Patient will less stressed with daughter's suicidal thoughts and gender stress.    I will know I've met my goal when know how to support my daughter.       Objective #A (Patient Action)                          Status: New - Date: 1/4/21      Patient will identify 2 strategies to more effectively address stressors  use \"worry time\" each day for 15 minutes of scheduled worry and then defer obsessive or anxious thinking until the next structured worry time  track and record at least 2 pleasant exchanges with daughter.     Intervention(s)  Therapist will assign homework how to support daughter  role-play communication with daughter.     Patient has reviewed and agreed to the above plan.                January 4, 2021      Juana Flores LP  January 27, 2021    "

## 2021-02-17 ENCOUNTER — VIRTUAL VISIT (OUTPATIENT)
Dept: PSYCHOLOGY | Facility: CLINIC | Age: 51
End: 2021-02-17
Payer: COMMERCIAL

## 2021-02-17 DIAGNOSIS — F43.22 ADJUSTMENT DISORDER WITH ANXIETY: Primary | ICD-10-CM

## 2021-02-17 PROCEDURE — 90834 PSYTX W PT 45 MINUTES: CPT | Mod: 95 | Performed by: PSYCHOLOGIST

## 2021-02-17 NOTE — PROGRESS NOTES
Progress Note    Patient Name: Ricky Varela  Date: 2/17/2021       Service Type: Individual      Session Start Time: 8:30  Session End Time: 9:22     Session Length: 52 min    Session #: 6    Attendees:  Client and Spouse / Significant Other     Service Modality: VideoVisit:      Telemedicine Visit: The patient's condition can be safely assessed and treated via synchronous audio and visual telemedicine encounter.       Reason for Telemedicine Visit: Services only offered telehealth     Originating Site (Patient Location): Patient's place of employment     Distant Site (Provider Location): Provider's home office     Consent:  The patient/guardian has verbally consented to: the potential risks and benefits of telemedicine (video visit) versus in person care; bill my insurance or make self-payment for services provided; and responsibility for payment of non-covered services.      Mode of Communication:  Video Conference via [employer]     As the provider I attest to compliance with applicable laws and regulations related to telemedicine.                   Treatment Plan Last Reviewed: 1/4/21  PHQ-9 / MARILEE-7 : 12/09/20     DATA  Interactive Complexity: No  Crisis: No          Progress Since Last Session (Related to Symptoms / Goals / Homework):   Symptoms: Improving felt closer and comfortable    Homework: Achieved / completed to satisfaction  Partially completed      Episode of Care Goals: Satisfactory progress - ACTION (Actively working towards change); Intervened by reinforcing change plan / affirming steps taken     Current / Ongoing Stressors and Concerns:   Daughter suicidal and gender questioning              Marriage stress and disconnect              Covid stress              House renovation     Treatment Objective(s) Addressed in This Session:   identify what is important with each other to strategies to more effectively address stressors  Increase interest,  engagement, and pleasure in doing things  track and record at least daily pleasant exchanges with wife  Comfort handling stress     Intervention:   CBT: understanding what is important  Emotion Focused Therapy: processed what desires they have to build close family  Interpersonal Therapy: when to schedule time and what to do         ASSESSMENT: Current Emotional / Mental Status (status of significant symptoms):              Risk status (Self / Other harm or suicidal ideation)              Patient denies current fears or concerns for personal safety.              Patient denies current or recent suicidal ideation or behaviors.              Patient denies current or recent homicidal ideation or behaviors.              Patient denies current or recent self injurious behavior or ideation.              Patient denies other safety concerns.              Patient reports there has been no change in risk factors since their last session.                Patient reports there has been no change in protective factors since their last session.                Recommended that patient call 911 or go to the local ED should there be a change in any of these risk factors.                 Appearance:                            not visible. on phone   Eye Contact:                           not visible. on phone   Psychomotor Behavior:          visible. on phone    Attitude:                                   Cooperative  Interested Friendly Pleasant  Orientation:                             All  Speech              Rate / Production:       Normal/ Responsive              Volume:                       Normal   Mood:                                      discouraged  Affect:                                      Appropriate   Thought Content:                    Clear   Thought Form:                        Coherent  Logical   Insight:                                     Good                  Medication Review:              No current  psychiatric medications prescribed                 Medication Compliance:              NA                 Changes in Health Issues:              None reported                 Chemical Use Review:              Substance Use: Chemical use reviewed, no active concerns identified                  Tobacco Use: No current tobacco use.       Diagnosis:  1. Adjustment disorder with anxiety       Collateral Reports Completed:              Not Applicable     PLAN: (Patient Tasks / Therapist Tasks / Other)  Time in room on Fridays. Sit together and build decision making forum. 10 min of touch and tenderness.  Spontaneous hugs and read the book: Sarabjit Flores, LP                                                           ______________________________________________________________________     Treatment Plan     Patient's Name: Ricky Varela                     YOB: 1970     Date: 1/4/2021     DSM5 Diagnoses:  Adjustment Disorders  309.24 (F43.22) With anxiety  Psychosocial & Contextual Factors: lack of intimacy, covid  WHODAS 2.0 (12 item):   WHODAS 2.0 Total Score 12/2/2020   Total Score MyChart 15         Referral / Collaboration:  Referral to another professional/service is not indicated at this time.     Anticipated number of session or this episode of care: 10        MeasurableTreatment Goal(s) related to diagnosis / functional impairment(s)  Goal 1: Patient will report more closeness and less stress.    I will know I've met my goal when their is more connection.       Objective #A (Patient Action)                          Patient will participate in affection activities to improve mood  use relaxation strategies 2 times per day to reduce the physical symptoms of anxiety  Increase interest, engagement, and pleasure in doing things  track and record at least 2 pleasant exchanges with wife.  Status: New - Date: 1/4/21      Intervention(s)  Therapist will assign homework affection trading  "times  provide facilitate closeness.        Goal 2: Patient will less stressed with daughter's suicidal thoughts and gender stress.    I will know I've met my goal when know how to support my daughter.       Objective #A (Patient Action)                          Status: New - Date: 1/4/21      Patient will identify 2 strategies to more effectively address stressors  use \"worry time\" each day for 15 minutes of scheduled worry and then defer obsessive or anxious thinking until the next structured worry time  track and record at least 2 pleasant exchanges with daughter.     Intervention(s)  Therapist will assign homework how to support daughter  role-play communication with daughter.     Patient has reviewed and agreed to the above plan.                January 4, 2021        Juana Flores LP  February 17, 2021      "

## 2021-03-03 ENCOUNTER — VIRTUAL VISIT (OUTPATIENT)
Dept: PSYCHOLOGY | Facility: CLINIC | Age: 51
End: 2021-03-03
Payer: COMMERCIAL

## 2021-03-03 DIAGNOSIS — F43.22 ADJUSTMENT DISORDER WITH ANXIETY: Primary | ICD-10-CM

## 2021-03-03 PROCEDURE — 90847 FAMILY PSYTX W/PT 50 MIN: CPT | Mod: 95 | Performed by: PSYCHOLOGIST

## 2021-03-03 NOTE — PROGRESS NOTES
"                                           Progress Note    Patient Name: Ricky Varela  Date: 3/3/2021       Service Type: Family with client present      Session Start Time: 8:31  Session End Time: 9:22     Session Length: 51 min    Session #: 7    Attendees: Client and Spouse / Significant Other    Service Modality:  Phone Visit:      Provider verified identity through the following two step process.  Patient provided:  Patient is known previously to provider    The patient has been notified of the following:      \"We have found that certain health care needs can be provided without the need for a face to face visit.  This service lets us provide the care you need with a phone conversation.       I will have full access to your [facility] medical record during this entire phone call.   I will be taking notes for your medical record.      Since this is like an office visit, we will bill your insurance company for this service.       There are potential benefits and risks of telephone visits (e.g. limits to patient confidentiality) that differ from in-person visits.?Confidentiality still applies for telephone services, and nobody will record the visit.  It is important to be in a quiet, private space that is free of distractions (including cell phone or other devices) during the visit.??      If during the course of the call I believe a telephone visit is not appropriate, you will not be charged for this service\"     Consent has been obtained for this service by care team member: Yes      Treatment Plan Last Reviewed: 1/4/21  PHQ-9 / MARILEE-7 : 12/09/20    DATA  Interactive Complexity: No  Crisis: No       Progress Since Last Session (Related to Symptoms / Goals / Homework):   Symptoms: Improving more physical closeness    Homework: Partially completed  some closeness but a big argument      Episode of Care Goals: Minimal progress - ACTION (Actively working towards change); Intervened by reinforcing change plan " / affirming steps taken     Current / Ongoing Stressors and Concerns:   Daughter suicidal and gender questioning              Marriage stress and disconnect              Covid stress              House renovation     Treatment Objective(s) Addressed in This Session:   identify communication and less reactive strategies to more effectively address stressors  identify at least 2 techniques for intervening on the escalation  use anger journal once daily to express 2 thought distortions or irrational beliefs that contribute to anger or irritation  Decrease frequency and intensity of feeling down, depressed, hopeless  Identify negative self-talk and behaviors: challenge core beliefs, myths, and actions  track and record at least daily pleasant exchanges with wife  closeness     Intervention:   CBT: reduce projections  Emotion Focused Therapy: understanding sensitive areas  Interpersonal Therapy: conflict resolution and communication  cultural sensitivity to shame of mistakes                     ASSESSMENT: Current Emotional / Mental Status (status of significant symptoms):              Risk status (Self / Other harm or suicidal ideation)              Patient denies current fears or concerns for personal safety.              Patient denies current or recent suicidal ideation or behaviors.              Patient denies current or recent homicidal ideation or behaviors.              Patient denies current or recent self injurious behavior or ideation.              Patient denies other safety concerns.              Patient reports there has been no change in risk factors since their last session.                Patient reports there has been no change in protective factors since their last session.                Recommended that patient call 911 or go to the local ED should there be a change in any of these risk factors.                 Appearance:                not visible. on phone   Eye  Contact:                           not visible. on phone   Psychomotor Behavior:          visible. on phone    Attitude:                                   Cooperative  Interested Friendly Pleasant  Orientation:                             All  Speech              Rate / Production:       Normal/ Responsive              Volume:                       Normal   Mood:                                      discouraged  Affect:                                      Appropriate   Thought Content:                    Clear   Thought Form:                        Coherent  Logical   Insight:                                     Good                  Medication Review:              No current psychiatric medications prescribed                 Medication Compliance:              NA                 Changes in Health Issues:              None reported                 Chemical Use Review:              Substance Use: Chemical use reviewed, no active concerns identified                  Tobacco Use: No current tobacco use.       Diagnosis:  1. Adjustment disorder with anxiety       Collateral Reports Completed:              Not Applicable     PLAN: (Patient Tasks / Therapist Tasks / Other)  Reduce projections. Time in room on Fridays. Sit together and build decision making forum. 10 min of touch and tenderness.  Spontaneous hugs and read the book: Sarabjit Flores, WAI                                                           ______________________________________________________________________     Treatment Plan     Patient's Name: Ricky Varela                     YOB: 1970     Date: 1/4/2021     DSM5 Diagnoses:  Adjustment Disorders  309.24 (F43.22) With anxiety  Psychosocial & Contextual Factors: lack of intimacy, covid  WHODAS 2.0 (12 item):   WHODAS 2.0 Total Score 12/2/2020   Total Score Alysahart 15         Referral / Collaboration:  Referral to another professional/service is not indicated at this  "time.     Anticipated number of session or this episode of care: 10        MeasurableTreatment Goal(s) related to diagnosis / functional impairment(s)  Goal 1: Patient will report more closeness and less stress.    I will know I've met my goal when their is more connection.       Objective #A (Patient Action)                          Patient will participate in affection activities to improve mood  use relaxation strategies 2 times per day to reduce the physical symptoms of anxiety  Increase interest, engagement, and pleasure in doing things  track and record at least 2 pleasant exchanges with wife.  Status: New - Date: 1/4/21      Intervention(s)  Therapist will assign homework affection trading times  provide facilitate closeness.        Goal 2: Patient will less stressed with daughter's suicidal thoughts and gender stress.    I will know I've met my goal when know how to support my daughter.       Objective #A (Patient Action)                          Status: New - Date: 1/4/21      Patient will identify 2 strategies to more effectively address stressors  use \"worry time\" each day for 15 minutes of scheduled worry and then defer obsessive or anxious thinking until the next structured worry time  track and record at least 2 pleasant exchanges with daughter.     Intervention(s)  Therapist will assign homework how to support daughter  role-play communication with daughter.     Patient has reviewed and agreed to the above plan.                January 4, 2021    Juana Flores, WAI  March 3, 2021  "

## 2021-03-17 ENCOUNTER — VIRTUAL VISIT (OUTPATIENT)
Dept: PSYCHOLOGY | Facility: CLINIC | Age: 51
End: 2021-03-17
Payer: COMMERCIAL

## 2021-03-17 DIAGNOSIS — F43.22 ADJUSTMENT DISORDER WITH ANXIETY: Primary | ICD-10-CM

## 2021-03-17 PROCEDURE — 90847 FAMILY PSYTX W/PT 50 MIN: CPT | Mod: 95 | Performed by: PSYCHOLOGIST

## 2021-03-17 NOTE — PROGRESS NOTES
"                                           Progress Note    Patient Name: Ricky Varela  Date: 3/17/2021       Service Type: Individual      Session Start Time: 8:30  Session End Time: 9:22     Session Length: 52 min    Session #:  7     Attendees:     Client and Spouse / Significant Other     Service Modality:  Phone Visit:      Provider verified identity through the following two step process.  Patient provided:  Patient is known previously to provider     The patient has been notified of the following:      \"We have found that certain health care needs can be provided without the need for a face to face visit.  This service lets us provide the care you need with a phone conversation.       I will have full access to your [facility] medical record during this entire phone call.   I will be taking notes for your medical record.      Since this is like an office visit, we will bill your insurance company for this service.       There are potential benefits and risks of telephone visits (e.g. limits to patient confidentiality) that differ from in-person visits.?Confidentiality still applies for telephone services, and nobody will record the visit.  It is important to be in a quiet, private space that is free of distractions (including cell phone or other devices) during the visit.??      If during the course of the call I believe a telephone visit is not appropriate, you will not be charged for this service\"     Consent has been obtained for this service by care team member: Yes                 Treatment Plan Last Reviewed: 1/4/21  PHQ-9 / MARILEE-7 : 12/09/20     DATA  Interactive Complexity: No  Crisis: No       Progress Since Last Session (Related to Symptoms / Goals / Homework):   Symptoms: Improving laughed, were abble to say they are closer.    Homework: Achieved / completed to satisfaction      Episode of Care Goals: Satisfactory progress - ACTION (Actively working towards change); Intervened by reinforcing " change plan / affirming steps taken     Current / Ongoing Stressors and Concerns:   Daughter suicidal and gender questioning              Marriage stress and disconnect              Covid stress              House renovation     Treatment Objective(s) Addressed in This Session:   participate in touch and affectionate activities to improve mood  Identify negative self-talk and behaviors: challenge core beliefs, myths, and actions  track and record at least weekly pleasant exchanges with wife  Sex therapy - progress with touch     Intervention:   CBT: reduce interpretation of wife's sexual pressure  Interpersonal Therapy: levels of touch - workrd on clear clommunication about touch, how wife wants                                                ASSESSMENT: Current Emotional / Mental Status (status of significant symptoms):              Risk status (Self / Other harm or suicidal ideation)              Patient denies current fears or concerns for personal safety.              Patient denies current or recent suicidal ideation or behaviors.              Patient denies current or recent homicidal ideation or behaviors.              Patient denies current or recent self injurious behavior or ideation.              Patient denies other safety concerns.              Patient reports there has been no change in risk factors since their last session.                Patient reports there has been no change in protective factors since their last session.                Recommended that patient call 911 or go to the local ED should there be a change in any of these risk factors.                 Appearance:                not visible. on phone   Eye Contact:                           not visible. on phone   Psychomotor Behavior:          visible. on phone    Attitude:                                   Cooperative  Interested Friendly Pleasant  Orientation:                             All  Speech              Rate /  Production:       Normal/ Responsive              Volume:                       Normal   Mood:                                      discouraged  Affect:                                      Appropriate   Thought Content:                    Clear   Thought Form:                        Coherent  Logical   Insight:                                     Good                  Medication Review:              No current psychiatric medications prescribed                 Medication Compliance:              NA                 Changes in Health Issues:              None reported                 Chemical Use Review:              Substance Use: Chemical use reviewed, no active concerns identified                  Tobacco Use: No current tobacco use.       Diagnosis:  1. Adjustment disorder with anxiety       Collateral Reports Completed:              Not Applicable     PLAN: (Patient Tasks / Therapist Tasks / Other)  Initiate more touch and Jacqui work on touching for her pleasure. Reduce projections. Time in room on Fridays. Sit together and build decision making forum. 10 min of touch and tenderness.  Spontaneous hugs and read the book: Sarabjit Flores, LP                                                           ______________________________________________________________________     Treatment Plan     Patient's Name: Ricky Varela                     YOB: 1970     Date: 1/4/2021     DSM5 Diagnoses:  Adjustment Disorders  309.24 (F43.22) With anxiety  Psychosocial & Contextual Factors: lack of intimacy, covid  WHODAS 2.0 (12 item):   WHODAS 2.0 Total Score 12/2/2020   Total Score MyChart 15         Referral / Collaboration:  Referral to another professional/service is not indicated at this time.     Anticipated number of session or this episode of care: 10        MeasurableTreatment Goal(s) related to diagnosis / functional impairment(s)  Goal 1: Patient will report more closeness and less  "stress.    I will know I've met my goal when their is more connection.       Objective #A (Patient Action)                          Patient will participate in affection activities to improve mood  use relaxation strategies 2 times per day to reduce the physical symptoms of anxiety  Increase interest, engagement, and pleasure in doing things  track and record at least 2 pleasant exchanges with wife.  Status: New - Date: 1/4/21, Reviewed 3/17/2021     Intervention(s)  Therapist will assign homework affection trading times  provide facilitate closeness.        Goal 2: Patient will less stressed with daughter's suicidal thoughts and gender stress.    I will know I've met my goal when know how to support my daughter.       Objective #A (Patient Action)                          Status: New - Date: 1/4/21  - Not an identified now 3/17/2021     Patient will identify 2 strategies to more effectively address stressors  use \"worry time\" each day for 15 minutes of scheduled worry and then defer obsessive or anxious thinking until the next structured worry time  track and record at least 2 pleasant exchanges with daughter.     Intervention(s)  Therapist will assign homework how to support daughter  role-play communication with daughter.     Patient has reviewed and agreed to the above plan.               Juana Flores, WAI  March 17, 2021    "

## 2021-04-07 ENCOUNTER — VIRTUAL VISIT (OUTPATIENT)
Dept: PSYCHOLOGY | Facility: CLINIC | Age: 51
End: 2021-04-07
Payer: COMMERCIAL

## 2021-04-07 DIAGNOSIS — F43.22 ADJUSTMENT DISORDER WITH ANXIETY: Primary | ICD-10-CM

## 2021-04-07 PROCEDURE — 90847 FAMILY PSYTX W/PT 50 MIN: CPT | Mod: 95 | Performed by: PSYCHOLOGIST

## 2021-04-07 NOTE — PROGRESS NOTES
"                                           Progress Note    Patient Name: Ricky Varela  Date: 4/7/2021       Service Type: Family with client present      Session Start Time: 8:30  Session End Time: 9:22     Session Length: 52 min    Session #: 8    Attendees:   Client and Spouse / Significant Other     Service Modality:  Phone Visit:      Provider verified identity through the following two step process.  Patient provided:  Patient is known previously to provider     The patient has been notified of the following:      \"We have found that certain health care needs can be provided without the need for a face to face visit.  This service lets us provide the care you need with a phone conversation.       I will have full access to your [facility] medical record during this entire phone call.   I will be taking notes for your medical record.      Since this is like an office visit, we will bill your insurance company for this service.       There are potential benefits and risks of telephone visits (e.g. limits to patient confidentiality) that differ from in-person visits.?Confidentiality still applies for telephone services, and nobody will record the visit.  It is important to be in a quiet, private space that is free of distractions (including cell phone or other devices) during the visit.??      If during the course of the call I believe a telephone visit is not appropriate, you will not be charged for this service\"     Consent has been obtained for this service by care team member: Yes                 Treatment Plan Last Reviewed: 1/4/21  PHQ-9 / MARILEE-7 : 12/09/20     DATA  Interactive Complexity: No  Crisis: No       Progress Since Last Session (Related to Symptoms / Goals / Homework):   Symptoms: Improving Ongoing comfort together. Working on increased physical contact.    Homework: Partially completed      Episode of Care Goals: Satisfactory progress - ACTION (Actively working towards change); Intervened " by reinforcing change plan / affirming steps taken     Current / Ongoing Stressors and Concerns:   Daughter suicidal and gender questioning              Marriage stress and disconnect              Covid stress              House renovation     Treatment Objective(s) Addressed in This Session:   identify sexual pleasure practices with wife and strategies to more effectively address stressors  initiate affectionate and social contact(s) per day for increasing lengths of time  track and record at least 3 times weekly pleasant exchanges with wife     Intervention:   CBT: reduce fearsand tension, clarify interpretation of spouses intentions  Interpersonal Therapy: education about affection stages and sexual blueprints. Validated differences  Extending session frequency                              ASSESSMENT: Current Emotional / Mental Status (status of significant symptoms):              Risk status (Self / Other harm or suicidal ideation)              Patient denies current fears or concerns for personal safety.              Patient denies current or recent suicidal ideation or behaviors.              Patient denies current or recent homicidal ideation or behaviors.              Patient denies current or recent self injurious behavior or ideation.              Patient denies other safety concerns.              Patient reports there has been no change in risk factors since their last session.                Patient reports there has been no change in protective factors since their last session.                Recommended that patient call 911 or go to the local ED should there be a change in any of these risk factors.                 Appearance:                not visible. on phone   Eye Contact:                           not visible. on phone   Psychomotor Behavior:          visible. on phone    Attitude:                                   Cooperative  Interested Friendly  Pleasant  Orientation:                             All  Speech              Rate / Production:       Normal/ Responsive              Volume:                       Normal   Mood:                                      discouraged  Affect:                                      Appropriate   Thought Content:                    Clear   Thought Form:                        Coherent  Logical   Insight:                                     Good                  Medication Review:              No current psychiatric medications prescribed                 Medication Compliance:              NA                 Changes in Health Issues:              None reported                 Chemical Use Review:              Substance Use: Chemical use reviewed, no active concerns identified                  Tobacco Use: No current tobacco use.       Diagnosis:  1. Adjustment disorder with anxiety       Collateral Reports Completed:              Not Applicable     PLAN: (Patient Tasks / Therapist Tasks / Other)  Communicate about touch with Jacqui. Sessions of touching for her pleasure. Reduce projections. Time in room on Fridays. Sit together and build decision making forum. 10 min of touch and tenderness.  Spontaneous hugs and read the book: Sarabjit Flores, LP                                                           ______________________________________________________________________     Treatment Plan     Patient's Name: Ricky Varela                     YOB: 1970     Date: 1/4/2021     DSM5 Diagnoses:  Adjustment Disorders  309.24 (F43.22) With anxiety  Psychosocial & Contextual Factors: lack of intimacy, covid  WHODAS 2.0 (12 item):   WHODAS 2.0 Total Score 12/2/2020   Total Score MyChart 15         Referral / Collaboration:  Referral to another professional/service is not indicated at this time.     Anticipated number of session or this episode of care: 10        MeasurableTreatment Goal(s) related  "to diagnosis / functional impairment(s)  Goal 1: Patient will report more closeness and less stress.    I will know I've met my goal when their is more connection.       Objective #A (Patient Action)                          Patient will participate in affection activities to improve mood  use relaxation strategies 2 times per day to reduce the physical symptoms of anxiety  Increase interest, engagement, and pleasure in doing things  track and record at least 2 pleasant exchanges with wife.  Status: New - Date: 1/4/21, Reviewed 3/17/2021     Intervention(s)  Therapist will assign homework affection trading times  provide facilitate closeness.        Goal 2: Patient will less stressed with daughter's suicidal thoughts and gender stress.    I will know I've met my goal when know how to support my daughter.       Objective #A (Patient Action)                          Status: New - Date: 1/4/21  - Not an identified now 3/17/2021     Patient will identify 2 strategies to more effectively address stressors  use \"worry time\" each day for 15 minutes of scheduled worry and then defer obsessive or anxious thinking until the next structured worry time  track and record at least 2 pleasant exchanges with daughter.     Intervention(s)  Therapist will assign homework how to support daughter  role-play communication with daughter.     Patient has reviewed and agreed to the above plan.                       March 17, 2021    Juana Flores, WAI  April 7, 2021    "

## 2021-04-21 ENCOUNTER — VIRTUAL VISIT (OUTPATIENT)
Dept: PSYCHOLOGY | Facility: CLINIC | Age: 51
End: 2021-04-21
Payer: COMMERCIAL

## 2021-04-21 DIAGNOSIS — F43.22 ADJUSTMENT DISORDER WITH ANXIETY: Primary | ICD-10-CM

## 2021-04-21 PROCEDURE — 90834 PSYTX W PT 45 MINUTES: CPT | Mod: TEL | Performed by: PSYCHOLOGIST

## 2021-04-21 NOTE — PROGRESS NOTES
"                                           Progress Note    Patient Name: Ricky Varela  Date: 4/21/2021       Service Type: Family with client present      Session Start Time: 8:30  Session End Time: 9:17     Session Length: 47 min    Session #: 9    Attendees: Client and Spouse / Significant Other     Service Modality:  Phone Visit:      Provider verified identity through the following two step process.  Patient provided:  Patient is known previously to provider     The patient has been notified of the following:      \"We have found that certain health care needs can be provided without the need for a face to face visit.  This service lets us provide the care you need with a phone conversation.       I will have full access to your [facility] medical record during this entire phone call.   I will be taking notes for your medical record.      Since this is like an office visit, we will bill your insurance company for this service.       There are potential benefits and risks of telephone visits (e.g. limits to patient confidentiality) that differ from in-person visits.?Confidentiality still applies for telephone services, and nobody will record the visit.  It is important to be in a quiet, private space that is free of distractions (including cell phone or other devices) during the visit.??      If during the course of the call I believe a telephone visit is not appropriate, you will not be charged for this service\"     Consent has been obtained for this service by care team member: Yes                 Treatment Plan Last Reviewed: 3/17/21  PHQ-9 / MARILEE-7 : 12/09/20     DATA  Interactive Complexity: No  Crisis: No       Progress Since Last Session (Related to Symptoms / Goals / Homework):   Symptoms: Improving Ricky is happy with progress and clear direction about where they are with the relationship    Homework: Achieved / completed to satisfaction  more affection and loving touch      Episode of Care Goals: " Achieved / completed to satisfaction - ACTION (Actively working towards change); Intervened by reinforcing change plan / affirming steps taken     Current / Ongoing Stressors and Concerns:   Daughter had a history of suicidal and gender questioning              Marriage stress and disconnect              Covid stress              House renovation     Treatment Objective(s) Addressed in This Session:   identify cooperation strategies to more effectively address stressors  Increase interest, engagement, and pleasure in doing things  track and record at least weekly pleasant exchanges with wife  Stress with contractors on their home     Intervention:   CBT: reduce negative impact of contractors on family  Interpersonal Therapy: building trust and communication about when to progress with the affection                                ASSESSMENT: Current Emotional / Mental Status (status of significant symptoms):              Risk status (Self / Other harm or suicidal ideation)              Patient denies current fears or concerns for personal safety.              Patient denies current or recent suicidal ideation or behaviors.              Patient denies current or recent homicidal ideation or behaviors.              Patient denies current or recent self injurious behavior or ideation.              Patient denies other safety concerns.              Patient reports there has been no change in risk factors since their last session.                Patient reports there has been no change in protective factors since their last session.                Recommended that patient call 911 or go to the local ED should there be a change in any of these risk factors.                 Appearance:                not visible. on phone   Eye Contact:                           not visible. on phone   Psychomotor Behavior:          visible. on phone    Attitude:                                   Cooperative  Interested Friendly  Pleasant  Orientation:                             All  Speech              Rate / Production:       Normal/ Responsive              Volume:                       Normal   Mood:                                      pleased, more hopeful  Affect:                                      Appropriate   Thought Content:                    Clear   Thought Form:                        Coherent  Logical   Insight:                                     Good                  Medication Review:              No current psychiatric medications prescribed                 Medication Compliance:              NA                 Changes in Health Issues:              None reported                 Chemical Use Review:              Substance Use: Chemical use reviewed, no active concerns identified                  Tobacco Use: No current tobacco use.       Diagnosis:  1. Adjustment disorder with anxiety       Collateral Reports Completed:              Not Applicable     PLAN: (Patient Tasks / Therapist Tasks / Other)  Communicate about touch with Jacqui. Sessions of touching for her pleasure. Reduce projections. Time in room on Fridays. Sit together and build decision making forum. 10 min of touch and tenderness.  Spontaneous hugs and read the book: Sarabjit Flores, LP                                                           ______________________________________________________________________     Treatment Plan     Patient's Name: Ricky Varela                     YOB: 1970     Date: 1/4/2021     DSM5 Diagnoses:  Adjustment Disorders  309.24 (F43.22) With anxiety  Psychosocial & Contextual Factors: lack of intimacy, covid  WHODAS 2.0 (12 item):   WHODAS 2.0 Total Score 12/2/2020   Total Score MyChart 15         Referral / Collaboration:  Referral to another professional/service is not indicated at this time.     Anticipated number of session or this episode of care: 10        MeasurableTreatment  "Goal(s) related to diagnosis / functional impairment(s)  Goal 1: Patient will report more closeness and less stress.    I will know I've met my goal when their is more connection.       Objective #A (Patient Action)                          Patient will participate in affection activities to improve mood  use relaxation strategies 2 times per day to reduce the physical symptoms of anxiety  Increase interest, engagement, and pleasure in doing things  track and record at least 2 pleasant exchanges with wife.  Status: New - Date: 1/4/21, Reviewed 3/17/2021     Intervention(s)  Therapist will assign homework affection trading times  provide facilitate closeness.        Goal 2: Patient will less stressed with daughter's suicidal thoughts and gender stress.    I will know I've met my goal when know how to support my daughter.       Objective #A (Patient Action)                          Status: New - Date: 1/4/21  - Not an identified now 3/17/2021     Patient will identify 2 strategies to more effectively address stressors  use \"worry time\" each day for 15 minutes of scheduled worry and then defer obsessive or anxious thinking until the next structured worry time  track and record at least 2 pleasant exchanges with daughter.     Intervention(s)  Therapist will assign homework how to support daughter  role-play communication with daughter.     Patient has reviewed and agreed to the above plan.                       March 17, 2021      Juana Flores LP  April 21, 2021    "

## 2021-05-19 ENCOUNTER — VIRTUAL VISIT (OUTPATIENT)
Dept: PSYCHOLOGY | Facility: CLINIC | Age: 51
End: 2021-05-19
Payer: COMMERCIAL

## 2021-05-19 DIAGNOSIS — F43.22 ADJUSTMENT DISORDER WITH ANXIETY: Primary | ICD-10-CM

## 2021-05-19 PROCEDURE — 90847 FAMILY PSYTX W/PT 50 MIN: CPT | Mod: 95 | Performed by: PSYCHOLOGIST

## 2021-05-19 NOTE — PROGRESS NOTES
"                                           Progress Note    Patient Name: Ricky Varela  Date: 5/19/2021       Service Type: Individual      Session Start Time: 8:32  Session End Time: 9:28     Session Length: 56 min    Session #: 10    Attendees:  Client and Spouse / Significant Other     Service Modality:  Phone Visit:      Provider verified identity through the following two step process.  Patient provided:  Patient is known previously to provider     The patient has been notified of the following:      \"We have found that certain health care needs can be provided without the need for a face to face visit.  This service lets us provide the care you need with a phone conversation.       I will have full access to your [facility] medical record during this entire phone call.   I will be taking notes for your medical record.      Since this is like an office visit, we will bill your insurance company for this service.       There are potential benefits and risks of telephone visits (e.g. limits to patient confidentiality) that differ from in-person visits.?Confidentiality still applies for telephone services, and nobody will record the visit.  It is important to be in a quiet, private space that is free of distractions (including cell phone or other devices) during the visit.??      If during the course of the call I believe a telephone visit is not appropriate, you will not be charged for this service\"     Consent has been obtained for this service by care team member: Yes                 Treatment Plan Last Reviewed: 3/17/21  PHQ-9 / MARILEE-7 : 12/09/20     DATA  Interactive Complexity: No - longer session for referral sn support of a difficult subject.  Crisis: No       Progress Since Last Session (Related to Symptoms / Goals / Homework):   Symptoms: Improving happier, they enjoy time toether and are working on healing wounds.    Homework: Partially completed      Episode of Care Goals: Satisfactory progress - " ACTION (Actively working towards change); Intervened by reinforcing change plan / affirming steps taken     Current / Ongoing Stressors and Concerns:   Daughter had a history of suicidal and gender questioning              Marriage stress and disconnect              Covid stress              House renovation - legal problems with contractor     Treatment Objective(s) Addressed in This Session:   identify pressure and obligation strategies to more effectively address stressors  increase length and frequency of contact with others mindful of own needs  Increase interest, engagement, and pleasure in doing things  track and record at least weekly pleasant exchanges with wife  Recovering from years of wife feeling obligatory actions.     Intervention:   CBT: communication plan for not obligatory  Emotion Focused Therapy: worked on Jacqui feeling heard about past pressure  Interpersonal Therapy: Referral to indiividual therapy for psst behaviors and books: [facility] and [facility] Heal                               ASSESSMENT: Current Emotional / Mental Status (status of significant symptoms):              Risk status (Self / Other harm or suicidal ideation)              Patient denies current fears or concerns for personal safety.              Patient denies current or recent suicidal ideation or behaviors.              Patient denies current or recent homicidal ideation or behaviors.              Patient denies current or recent self injurious behavior or ideation.              Patient denies other safety concerns.              Patient reports there has been no change in risk factors since their last session.                Patient reports there has been no change in protective factors since their last session.                Recommended that patient call 911 or go to the local ED should there be a change in any of these risk factors.                 Appearance:                not visible. on phone   Eye  Contact:                           not visible. on phone   Psychomotor Behavior:          visible. on phone    Attitude:                                   Cooperative  Interested Friendly Pleasant  Orientation:                             All  Speech              Rate / Production:       Normal/ Responsive              Volume:                       Normal   Mood:                                      pleased, more hopeful, sad and guilty  Affect:                                      Appropriate   Thought Content:                    Clear   Thought Form:                        Coherent  Logical   Insight:                                     Good                  Medication Review:              No current psychiatric medications prescribed                 Medication Compliance:              NA                 Changes in Health Issues:              None reported                 Chemical Use Review:              Substance Use: Chemical use reviewed, no active concerns identified                  Tobacco Use: No current tobacco use.       Diagnosis:  1. Adjustment disorder with anxiety       Collateral Reports Completed:              Not Applicable     PLAN: (Patient Tasks / Therapist Tasks / Other)  Notice obligatory actions. Communicate about touch with Jacqui. Sessions of touching for her pleasure. Reduce projections. Time in room on Fridays. Sit together and build decision making forum. 10 min of touch and tenderness.  Spontaneous hugs and read the book: Sarabjit Flores, WAI                                                           ______________________________________________________________________     Treatment Plan     Patient's Name: Ricky Varela                     YOB: 1970     Date: 1/4/2021     DSM5 Diagnoses:  Adjustment Disorders  309.24 (F43.22) With anxiety  Psychosocial & Contextual Factors: lack of intimacy, covid  WHODAS 2.0 (12 item):   WHODAS 2.0 Total Score  "12/2/2020   Total Score MyChart 15         Referral / Collaboration:  Referral to another professional/service is not indicated at this time.     Anticipated number of session or this episode of care: 10        MeasurableTreatment Goal(s) related to diagnosis / functional impairment(s)  Goal 1: Patient will report more closeness and less stress.    I will know I've met my goal when their is more connection.       Objective #A (Patient Action)                          Patient will participate in affection activities to improve mood  use relaxation strategies 2 times per day to reduce the physical symptoms of anxiety  Increase interest, engagement, and pleasure in doing things  track and record at least 2 pleasant exchanges with wife.  Status: New - Date: 1/4/21, Reviewed 3/17/2021     Intervention(s)  Therapist will assign homework affection trading times  provide facilitate closeness.        Goal 2: Patient will less stressed with daughter's suicidal thoughts and gender stress.    I will know I've met my goal when know how to support my daughter.       Objective #A (Patient Action)                          Status: New - Date: 1/4/21  - Not an identified now 3/17/2021     Patient will identify 2 strategies to more effectively address stressors  use \"worry time\" each day for 15 minutes of scheduled worry and then defer obsessive or anxious thinking until the next structured worry time  track and record at least 2 pleasant exchanges with daughter.     Intervention(s)  Therapist will assign homework how to support daughter  role-play communication with daughter.     Patient has reviewed and agreed to the above plan.                       March 17, 2021      Juana Flores LP  May 19, 2021    "

## 2021-06-16 ENCOUNTER — VIRTUAL VISIT (OUTPATIENT)
Dept: PSYCHOLOGY | Facility: CLINIC | Age: 51
End: 2021-06-16
Payer: COMMERCIAL

## 2021-06-16 DIAGNOSIS — F43.22 ADJUSTMENT DISORDER WITH ANXIETY: Primary | ICD-10-CM

## 2021-06-16 PROCEDURE — 90834 PSYTX W PT 45 MINUTES: CPT | Mod: TEL | Performed by: PSYCHOLOGIST
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
## 2021-07-06 ENCOUNTER — OFFICE VISIT (OUTPATIENT)
Dept: INTERNAL MEDICINE | Facility: CLINIC | Age: 51
End: 2021-07-06
Payer: COMMERCIAL

## 2021-07-06 VITALS
OXYGEN SATURATION: 98 % | BODY MASS INDEX: 24.06 KG/M2 | HEART RATE: 78 BPM | HEIGHT: 66 IN | DIASTOLIC BLOOD PRESSURE: 85 MMHG | SYSTOLIC BLOOD PRESSURE: 125 MMHG | WEIGHT: 149.7 LBS | RESPIRATION RATE: 16 BRPM

## 2021-07-06 DIAGNOSIS — Z00.00 HEALTHCARE MAINTENANCE: Primary | ICD-10-CM

## 2021-07-06 DIAGNOSIS — E78.5 HYPERLIPIDEMIA WITH TARGET LDL LESS THAN 160: ICD-10-CM

## 2021-07-06 DIAGNOSIS — M10.072 ACUTE IDIOPATHIC GOUT OF LEFT FOOT: ICD-10-CM

## 2021-07-06 PROCEDURE — 99386 PREV VISIT NEW AGE 40-64: CPT | Mod: GE | Performed by: STUDENT IN AN ORGANIZED HEALTH CARE EDUCATION/TRAINING PROGRAM

## 2021-07-06 ASSESSMENT — MIFFLIN-ST. JEOR: SCORE: 1476.78

## 2021-07-06 ASSESSMENT — PAIN SCALES - GENERAL: PAINLEVEL: NO PAIN (0)

## 2021-07-06 NOTE — NURSING NOTE
The patient was greeted in the 4th floor lobby and escorted back to the clinic. The patient's weight was recorded without incident. The patient was escorted to the exam room without incident. The reason for today's visit was discussed with the patient. The following are the primary complaints of the patient:     Chief Complaint   Patient presents with     Establish Care     Physical     Labs Only     The patient would like to see what his uric acid is.       The patient's allergies and medications were reviewed as noted. A set of vitals were recorded as noted without incident. The patient does not have any other questions for the provider.    Alfred Fair, EMT at 9:38 AM on 7/6/2021

## 2021-07-06 NOTE — PROGRESS NOTES
[facility]         HPI:       Ricky Varela is a 50 year-old man with history of dysplipidemia, dog allergy, vasectomy, and gout, who comes to the clinic to re-establish care.    Per chart records, Patient was last seen in clinic in 2015. He is doing well today and has no major complaints.     He has a history of dyslipidemia, for which he was started on atorvastatin 10 and fenofibrate 80, which were held after he developed muscle pain. Pt reports not taking these medications in several years, and has focused his intervention on modifying his diet. Latest lipid panel was in 05/2017 and showed cholesterol 226, [id number], HDL 41and triglycerides 219. Calculated ASCVD risk with those values ~ 5.3%. Muscle pain has resolved.    He has a family history of colon cancer in his father (~ age 60). He had a colonoscopy in 2015 that was unremarkable. He has not noticed any changes to his bowel habits, energy level, weight loss. He denies hematochezia or melena. He also denies constipation.    He reports a history of gout, with occasional flares (to L foot) controlled with colchicine. Latest flare was ~1 month ago. He is not on a prophylactic medicine. He has recently decreased his meat consumption, and rarely drinks wine.    He sees psychology for marriage counseling. He denies any depression or anxiety symptoms.    He has not had complaints pertaining to his allergies, which are controlled with prn fluticasone spray. He had previously been evaluated by [facility] for allergy to dog, tree and weed polled, and grass pollen.     He has a history of adult-age circumcision in 2014 when he converted to Tenriism. He also underwent vasectomy in 2011.    Remainder of HPI is unremarkable. No fevers, chest pain, shortness of breath, abdominal pain, dysuria/discharge.       PMHx:  Past Medical History:   Diagnosis Date     Alopecia areata     recurrent     Hyperlipidemia LDL goal < 160        PSHx:  Past Surgical History:    Procedure Laterality Date     CIRCUMCISION  7/8/2014    Procedure: CIRCUMCISION;  Surgeon: Tomas Francisco MD;  Location: [facility]     COLONOSCOPY  2015    NORMAL, repeat in 2020     VASECTOMY         FamHx:  Family History   Problem Relation Age of Onset     Diabetes Mother      Cancer - colorectal Father 64     Diabetes Brother        Allergies:     Allergies   Allergen Reactions     Atorvastatin Muscle Pain (Myalgia)     Dog Fennel Allergy Skin Test Itching     Ragweeds Other (See Comments)     rhinitis       Meds:    Current Outpatient Medications:      fluticasone (FLONASE) 50 MCG/ACT spray, Spray 2 sprays into both nostrils daily, Disp: 1 Bottle, Rfl: 3    SocHx:  Social History     Socioeconomic History     Marital status:      Spouse name: None     Number of children: None     Years of education: None     Highest education level: None   Occupational History     None   Social Needs     Financial resource strain: None     Food insecurity     Worry: None     Inability: None     Transportation needs     Medical: None     Non-medical: None   Tobacco Use     Smoking status: Never Smoker     Smokeless tobacco: Never Used   Substance and Sexual Activity     Alcohol use: No     Drug use: No     Sexual activity: None   Lifestyle     Physical activity     Days per week: None     Minutes per session: None     Stress: None   Relationships     Social connections     Talks on phone: None     Gets together: None     Attends Muslim service: None     Active member of club or organization: None     Attends meetings of clubs or organizations: None     Relationship status: None     Intimate partner violence     Fear of current or ex partner: None     Emotionally abused: None     Physically abused: None     Forced sexual activity: None   Other Topics Concern     Parent/sibling w/ CABG, MI or angioplasty before 65F 55M? Not Asked   Social History Narrative    Lives in [facility], with wife and children.   2007,  "two children (2008, 09).  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          From [place], immigrated in 1986.  Family here.  5 siblings       Problem, Medication and Allergy Lists were reviewed and are current.  Patient is a new patient to this clinic and so  I reviewed/updated the Past Medical History, the Family History and the Social History.          Review of Systems:     ROS  I have personally reviewed and updated the complete ROS on the day of the visit.           Physical Exam:   /85 (BP Location: Right arm, Patient Position: Sitting, Cuff Size: Adult Regular)   Pulse 78   Resp 16   Ht 1.676 m (5' 6\")   Wt 67.9 kg (149 lb 11.2 oz)   SpO2 98%   BMI 24.16 kg/m    Body mass index is 24.16 kg/m .  Vitals were reviewed       GENERAL APPEARANCE: healthy, alert and no distress     EYES: EOMI,  PERRL     HENT: ear canals and TM's normal and nose and mouth without ulcers or lesions     NECK: no adenopathy, no asymmetry, masses, or scars and thyroid normal to palpation     RESP: lungs clear to auscultation - no rales, rhonchi or wheezes     CV: regular rates and rhythm, normal S1 S2, no S3 or S4 and no murmur, click or rub     ABDOMEN:  soft, nontender, no HSM or masses and bowel sounds normal     MS: extremities normal- no gross deformities noted, no evidence of inflammation in joints, FROM in all extremities. No evidence of hallux valgus, erythema, edema in L or R foot.     SKIN: no suspicious lesions or rashes     NEURO: Normal strength and tone, sensory exam grossly normal, mentation intact and speech normal     PSYCH: mentation appears normal. and affect normal/bright     LYMPHATICS: No cervical adenopathy        Results:     Orders Only on 05/02/2017   Component Date Value Ref Range Status     Cholesterol 05/02/2017 226* <200 mg/dL Final    Desirable:       <200 mg/dl     Triglycerides 05/02/2017 219* <150 mg/dL Final    Comment: Borderline high:  150-199 mg/dl   High:             200-499 " mg/dl   Very high:       >499 mg/dl       HDL Cholesterol 05/02/2017 41  >39 mg/dL Final     LDL Cholesterol Calculated 05/02/2017 141* <100 mg/dL Final    Comment: Above desirable:  100-129 mg/dl   Borderline High:  130-159 mg/dL   High:             160-189 mg/dL   Very high:       >189 mg/dl       Non HDL Cholesterol 05/02/2017 185* <130 mg/dL Final    Comment: Above Desirable:  130-159 mg/dl   Borderline high:  160-189 mg/dl   High:             190-219 mg/dl   Very high:       >219 mg/dl       Sodium 05/02/2017 141  133 - 144 mmol/L Final     Potassium 05/02/2017 4.7  3.4 - 5.3 mmol/L Final     Chloride 05/02/2017 107  94 - 109 mmol/L Final     Carbon Dioxide 05/02/2017 27  20 - 32 mmol/L Final     Anion Gap 05/02/2017 7  3 - 14 mmol/L Final     Glucose 05/02/2017 94  70 - 99 mg/dL Final     Urea Nitrogen 05/02/2017 17  7 - 30 mg/dL Final     Creatinine 05/02/2017 1.02  0.66 - 1.25 mg/dL Final     GFR Estimate 05/02/2017 78  >60 mL/min/1.7m2 Final    Non  GFR Calc     GFR Estimate If Black 05/02/2017   >60 mL/min/1.7m2 Final                    Value:>90   GFR Calc       Calcium 05/02/2017 9.0  8.5 - 10.1 mg/dL Final     Bilirubin Total 05/02/2017 0.4  0.2 - 1.3 mg/dL Final     Albumin 05/02/2017 3.9  3.4 - 5.0 g/dL Final     Protein Total 05/02/2017 7.6  6.8 - 8.8 g/dL Final     Alkaline Phosphatase 05/02/2017 61  40 - 150 U/L Final     ALT 05/02/2017 42  0 - 70 U/L Final     AST 05/02/2017 29  0 - 45 U/L Final       Lab Results   Component Value Date     05/02/2017    POTASSIUM 4.7 05/02/2017    CHLORIDE 107 05/02/2017    CO2 27 05/02/2017    BUN 17 05/02/2017    CR 1.02 05/02/2017    GLC 94 05/02/2017    AST 29 05/02/2017    ALT 42 05/02/2017    ALKPHOS 61 05/02/2017    BILITOTAL 0.4 05/02/2017       Assessment and Plan     Go (Ricky) Alexa is a 50 year-old man with history of dysplipidemia, dog allergy, vasectomy, and gout, who comes to the clinic to re-establish  care.    Patient is doing well today and has no complaints.    Given his history of dyslipidemia and loss to follow up in recent years, we will order a fasting lipid panel. There is low suspicion these will warrant initiation of medical therapy based most recent ASCVD estimate of ~5%. We may consider a trial of a different statin (than atorvastatin) if medical therapy is indicated.    Given his familial history of colon cancer in his father, we spent a great portion of our visit today discussing the benefits of cancer screening via colonoscopy every 5 years at this time. Last colonoscopy was in 2015 and was unremarkable, and patient felt symptomatically well and declined getting a colonoscopy at this time, though he is due for one. Patient expressed understanding that not getting screened may mean not detecting cancer at an earlier, less morbid stage. Additionally, patient was offered FIT test, and also declined this modality at this time. Patient plans on getting his next colonoscopy 10 years from last one. Patient was amenable to baseline CBC and CMP, however.    Because of his account of gout flares, a uric acid was ordered, though patient at this time is asymptomatic, and prefers to treat flares on an as-needed basis and to modify his diet.    HIV and Hep C screens were also ordered, as patient appears not to have been screened for those.    Healthcare maintenance    -     Lipid Profile FASTING; Future  -     CBC with platelets differential; Future  -     Comprehensive metabolic panel; Future  -     HIV Antigen Antibody Combo; Future  -     HCV Screen with Reflex; Future    Hyperlipidemia with target LDL less than 160  -     Lipid Profile FASTING; Future    Acute idiopathic gout of left foot  -     Uric acid; Future    Options for treatment and follow-up care were reviewed with the patient. Ricky Varela engaged in the decision making process and verbalized understanding of the options discussed and agreed with  the final plan.    Himanshu Damian MD PhD  Jul 6, 2021    Pt was seen and plan of care discussed with Dr Sanabria.     While the patient was in clinic, I reviewed the pertinent medical history and results.  I discussed the current findings on physical examination, as well as the patient s diagnosis and treatment plan with the resident and agree with the information as documented with the following exceptions: none.  Jm Sanabria MD

## 2021-07-06 NOTE — PATIENT INSTRUCTIONS
Patient Education     Colorectal Cancer Screening  Colorectal cancer starts in cells that form the colon or rectum. It's one of the leading causes of cancer deaths in the [place] But when this cancer is found and treated early, when it's still small and hasn't spread, the chances of a full recovery are very good. Because colorectal cancer rarely causes symptoms in its early stages, screening for the disease is important. Screening is even more important if you have risk factors for this cancer. Learn more about colorectal cancer, its risk factors, and screening options. Then talk with your healthcare provider to decide what's best for you.     Risk factors for colorectal cancer  Your risk of having colorectal cancer increases if you:     Are 50 years of age or older, but it can start in people younger than 50    Have a family history or personal history of colorectal cancer or polyps    Are  or of Eastern  Cheondoism descent (Ashkenazic)    Have type 2 diabetes, Crohn s disease, or ulcerative colitis    Have an inherited genetic syndrome like Lopes syndrome (HNPCC) or familial adenomatous polyposis (FAP)    Are overweight    Are not physically active    Smoke    Drink a lot of alcohol (more than 2 drinks per day for men and 1 drink per day for women)    Eat a lot of red or processed meat  The colon and rectum  The colon and rectum are part of your digestive system. Food goes from your stomach, through your small intestine, then into your colon. As it travels through the colon, water is removed and the waste that is left (stool) becomes more solid. The muscles of your intestines push the stool toward the sigmoid colon. This is the last part of the colon. The stool then moves into the rectum. It's stored there until it s ready to leave your body during a bowel movement.   How colorectal cancer starts  Polyps are growths that form on the inner lining of the colon and rectum. Most are benign, which  means they aren t cancer. But over time, some polyps can become cancer. These are called malignant. This happens when cells in these polyps start to grow out of control. In time, the cancer cells can spread to more of the colon and rectum. The cancer can also spread to nearby organs or lymph nodes, and even to other parts of the body, like the liver or lungs. Finding and removing polyps before they become cancer can help keep cancer from starting.   Colorectal cancer screening  Screening means looking for a health problem before you have symptoms. Screening for colorectal cancer starts with:     Your health history. Your healthcare provider will ask about your health history and possible cancer risk factors. Tell your healthcare provider if you have a family member who has had colorectal cancer or polyps. Also mention any health problems you have had in the past.    Physical exam, including a digital rectal exam (CORY). A CORY might be done as part of your physical exam. To do it, your healthcare provider puts a lubricated gloved finger into your rectum. He or she checks for any lumps or changes that could be cancer. This doesn't hurt and takes less than a minute. CORY alone is not enough to screen for colorectal cancer. You'll also need one of the tests listed below.  Screening test choices  Screening advice varies among expert groups. Many suggest that people at average risk for colorectal cancer start routine screening at age 50. But the American Cancer Society (ACS) recommends starting screening at age 45. Your healthcare provider can help you decide what's best for you. It's also important to check with your health insurance provider.   Below are the most commonly used colorectal cancer screening tests. How often you should be screened depends on your risk and the test that you and your healthcare provider choose. If you have a family history of colon cancer or are at high risk for other reasons, you may need to  have screening earlier or more often.   Stool testing   Fecal occult blood test (FOBT) or fecal immunochemical test (FIT) (every year)   These tests check for blood in stool that you can t see (hidden or occult blood). Hidden blood may be a sign of colon polyps or cancer. A small sample of stool is sent to a lab where it's tested for blood. Most often, you collect this sample at home using a kit your healthcare provider gives you. Make sure you know what to do and follow the instructions carefully. For instance, you might need to not eat certain foods and not take certain medicines before collecting stool for this test.   Stool DNA test (every 3 years)  This test looks for cells in your stool that have changed DNA in them. These DNA changes might be signs of cancer or polyps. This test also looks for hidden blood in stool. For this test, you collect an entire bowel movement. This is done using a special container that's put in the toilet. The kit has instructions on how to collect, prepare, and send your stool. It goes to a lab for testing.   Visual exams  Colonoscopy (every 10 years)  This test allows your healthcare provider to find and remove polyps anywhere in your colon or rectum.   A day or 2 before the test, you'll do a bowel prep. This is a liquid diet plus a strong laxative solution or an enema. The bowel prep cleans out your colon so the lining can be seen during the test. You'll be given instructions on how to do the prep.   Just before the test, you're given a medicine to make you sleepy. Then the healthcare provider gently puts a long, flexible, lighted tube (called a colonoscope) into your rectum. The scope is guided through your entire colon. The provider looks at images of the inside of your colon on a video screen. Any polyps seen are removed and sent to a lab for testing. If a polyp can t be removed, a small piece of it is taken out for testing. If the tests show it might be cancer, the polyp might  be removed later during surgery.   You'll need to bring someone with you to drive you home after this test.   Colonoscopy is the only screening test that lets your healthcare provider see your entire colon and rectum. This test also lets your healthcare provider remove any pieces of tissue that need to be checked for cancer.   If something suspicious is found using any other colorectal cancer screening tests, you will likely need a colonoscopy.   Sigmoidoscopy (every 5 years)  This test is a lot like a colonoscopy. But it focuses only on the sigmoid colon and rectum. (The sigmoid colon is the last 2 feet or so that connects to your rectum. The entire colon is about 5 feet long.) As with colonoscopy, bowel prep must be done before this test.   You are awake during the test. But you might be given medicine to help you relax. During the test, the healthcare provider guides a thin, flexible, lighted tube called a sigmoidoscope through your rectum and lower colon. The images are displayed on a video screen. Polyps can be removed and sent to a lab for testing.   Virtual colonoscopy (every 5 years)  This test is also called a CT colonography. It uses a series of X-rays to make a 3-D image of your colon and rectum.   The day before the test, you'll need to do a bowel prep to clean out your colon. Your healthcare provider will give you instructions on how to do this.   During the test, you'll lie on a narrow table that's part of a special X-ray machine called a CT scanner. A soft, small tube will be placed into your rectum to fill your colon and rectum with air. Then, the table will move into the ring-shaped machine and pictures will be taken. A computer will combine these photos to create a 3-D image. Because the test uses X-rays, it exposes you to a small amount of radiation. This test can be done without sedation. If polyps or any suspicious changes are seen, you'll need a colonoscopy so that tissue can be removed for  testing.   Talking with your healthcare provider  Talk with your healthcare provider about which screening tests might be best for you. Each one has pros and cons. But no matter which test you choose, the most important thing is that you get screened. Keep in mind that if cancer is found at an early stage during screening, it's easier to treat and treatment is more likely to work well. Cancer can even be prevented with routine screening tests.   Note: If you choose a screening test other than a colonoscopy and have an abnormal test result, you'll need to follow-up with colonoscopy. This would not be considered a screening colonoscopy, so deductibles and co-pays may apply. Check with your health insurance provider so you know what to expect.   Know your risk: You may need to be screened using a different schedule if you have a personal or family history of colorectal cancer. A different schedule might also be needed if you have polyps or certain inherited conditions. These include familial adenomatous polyposis (FAP), Lopes syndrome (hereditary nonpolyposis colon cancer, HNPCC), or inflammatory bowel disease such as Crohn's or ulcerative colitis. Talk with your provider about your health history to decide on the colorectal cancer screening plan that's best for you.   [employer] last reviewed this educational content on 8/1/2020 2000-2021 [employer]. All rights reserved. This information is not intended as a substitute for professional medical care. Always follow your healthcare professional's instructions.

## 2021-07-07 ENCOUNTER — TELEPHONE (OUTPATIENT)
Dept: PSYCHOLOGY | Facility: CLINIC | Age: 51
End: 2021-07-07

## 2021-07-07 DIAGNOSIS — M10.072 ACUTE IDIOPATHIC GOUT OF LEFT FOOT: ICD-10-CM

## 2021-07-07 DIAGNOSIS — Z00.00 HEALTHCARE MAINTENANCE: ICD-10-CM

## 2021-07-07 DIAGNOSIS — E78.5 HYPERLIPIDEMIA WITH TARGET LDL LESS THAN 160: ICD-10-CM

## 2021-07-07 LAB
ALBUMIN SERPL-MCNC: 4.3 G/DL (ref 3.4–5)
ALP SERPL-CCNC: 77 U/L (ref 40–150)
ALT SERPL W P-5'-P-CCNC: 32 U/L (ref 0–70)
ANION GAP SERPL CALCULATED.3IONS-SCNC: 9 MMOL/L (ref 3–14)
AST SERPL W P-5'-P-CCNC: 23 U/L (ref 0–45)
BASOPHILS # BLD AUTO: 0.1 10E9/L (ref 0–0.2)
BASOPHILS NFR BLD AUTO: 1.1 %
BILIRUB SERPL-MCNC: 0.4 MG/DL (ref 0.2–1.3)
BUN SERPL-MCNC: 13 MG/DL (ref 7–30)
CALCIUM SERPL-MCNC: 9.2 MG/DL (ref 8.5–10.1)
CHLORIDE SERPL-SCNC: 107 MMOL/L (ref 94–109)
CHOLEST SERPL-MCNC: 251 MG/DL
CO2 SERPL-SCNC: 25 MMOL/L (ref 20–32)
CREAT SERPL-MCNC: 1.05 MG/DL (ref 0.66–1.25)
DIFFERENTIAL METHOD BLD: NORMAL
EOSINOPHIL # BLD AUTO: 0.1 10E9/L (ref 0–0.7)
EOSINOPHIL NFR BLD AUTO: 1.8 %
ERYTHROCYTE [DISTWIDTH] IN BLOOD BY AUTOMATED COUNT: 13.2 % (ref 10–15)
GFR SERPL CREATININE-BSD FRML MDRD: 81 ML/MIN/{1.73_M2}
GLUCOSE SERPL-MCNC: 104 MG/DL (ref 70–99)
HCT VFR BLD AUTO: 46.3 % (ref 40–53)
HCV AB SERPL QL IA: NONREACTIVE
HDLC SERPL-MCNC: 32 MG/DL
HGB BLD-MCNC: 15.5 G/DL (ref 13.3–17.7)
HIV 1+2 AB+HIV1 P24 AG SERPL QL IA: NONREACTIVE
IMM GRANULOCYTES # BLD: 0 10E9/L (ref 0–0.4)
IMM GRANULOCYTES NFR BLD: 0.4 %
LDLC SERPL CALC-MCNC: 156 MG/DL
LYMPHOCYTES # BLD AUTO: 1.9 10E9/L (ref 0.8–5.3)
LYMPHOCYTES NFR BLD AUTO: 34.6 %
MCH RBC QN AUTO: 26.7 PG (ref 26.5–33)
MCHC RBC AUTO-ENTMCNC: 33.5 G/DL (ref 31.5–36.5)
MCV RBC AUTO: 80 FL (ref 78–100)
MONOCYTES # BLD AUTO: 0.6 10E9/L (ref 0–1.3)
MONOCYTES NFR BLD AUTO: 9.9 %
NEUTROPHILS # BLD AUTO: 2.9 10E9/L (ref 1.6–8.3)
NEUTROPHILS NFR BLD AUTO: 52.2 %
NONHDLC SERPL-MCNC: 219 MG/DL
NRBC # BLD AUTO: 0 10*3/UL
NRBC BLD AUTO-RTO: 0 /100
PLATELET # BLD AUTO: 254 10E9/L (ref 150–450)
POTASSIUM SERPL-SCNC: 4.5 MMOL/L (ref 3.4–5.3)
PROT SERPL-MCNC: 8.1 G/DL (ref 6.8–8.8)
RBC # BLD AUTO: 5.8 10E12/L (ref 4.4–5.9)
SODIUM SERPL-SCNC: 140 MMOL/L (ref 133–144)
TRIGL SERPL-MCNC: 311 MG/DL
URATE SERPL-MCNC: 8.6 MG/DL (ref 3.5–7.2)
WBC # BLD AUTO: 5.6 10E9/L (ref 4–11)

## 2021-07-07 PROCEDURE — 80053 COMPREHEN METABOLIC PANEL: CPT | Performed by: PATHOLOGY

## 2021-07-07 PROCEDURE — 84550 ASSAY OF BLOOD/URIC ACID: CPT | Performed by: PATHOLOGY

## 2021-07-07 PROCEDURE — 87389 HIV-1 AG W/HIV-1&-2 AB AG IA: CPT | Mod: 90 | Performed by: PATHOLOGY

## 2021-07-07 PROCEDURE — 85025 COMPLETE CBC W/AUTO DIFF WBC: CPT | Performed by: PATHOLOGY

## 2021-07-07 PROCEDURE — 36415 COLL VENOUS BLD VENIPUNCTURE: CPT | Performed by: PATHOLOGY

## 2021-07-07 PROCEDURE — 86803 HEPATITIS C AB TEST: CPT | Mod: 90 | Performed by: PATHOLOGY

## 2021-07-07 PROCEDURE — 80061 LIPID PANEL: CPT | Performed by: PATHOLOGY

## 2021-07-07 NOTE — TELEPHONE ENCOUNTER
Contacted Ricky Varela regarding today's appointment.  He stated he  Has a lab appt in 15 min and forgot about this appt. I did not have an opening before his next appt and he said that was fine to wait. In August he will be getting ready for his son's [place] so needs an appt in Sept after I return.

## 2021-07-28 ENCOUNTER — VIRTUAL VISIT (OUTPATIENT)
Dept: PSYCHOLOGY | Facility: CLINIC | Age: 51
End: 2021-07-28
Payer: COMMERCIAL

## 2021-07-28 DIAGNOSIS — F43.22 ADJUSTMENT DISORDER WITH ANXIETY: Primary | ICD-10-CM

## 2021-07-28 PROCEDURE — 90834 PSYTX W PT 45 MINUTES: CPT | Mod: GT | Performed by: PSYCHOLOGIST

## 2021-07-28 NOTE — PROGRESS NOTES
Progress Note    Patient Name: Ricky Varela  Date: 7/28/2021       Service Type: Family with client present      Session Start Time: 8:30  Session End Time: 9:20     Session Length: 50 min    Session #: 12    Attendees: Client and Spouse / Significant Other    Service Modality:  Video Visit:      Provider verified identity through the following two step process.  Patient provided:  Patient photo and Patient is known previously to provider    Telemedicine Visit: The patient's condition can be safely assessed and treated via synchronous audio and visual telemedicine encounter.      Reason for Telemedicine Visit: Services only offered telehealth    Originating Site (Patient Location): Patient's home    Distant Site (Provider Location): Provider's home office    Consent:  The patient/guardian has verbally consented to: the potential risks and benefits of telemedicine (video visit) versus in person care; bill my insurance or make self-payment for services provided; and responsibility for payment of non-covered services.     Patient would like the video invitation sent by:  My Chart    Mode of Communication:  Video Conference via [employer]well    As the provider I attest to compliance with applicable laws and regulations related to telemedicine.     Treatment Plan Last Reviewed: 6/16/2021  PHQ-9 / MARILEE-7 : 12/09/20    DATA  Interactive Complexity: No  Crisis: No       Progress Since Last Session (Related to Symptoms / Goals / Homework):   Symptoms: Sarah is very stressed about a house project and Ricky left work early. Worries about wife a lot.    Homework: Achieved / completed to satisfaction  Partially completed  needed to adapt since Sarah has little energy for intimacy      Episode of Care Goals: Satisfactory progress - ACTION (Actively working towards change); Intervened by reinforcing change plan / affirming steps taken     Current / Ongoing Stressors and Concerns:   Daughter had a  history of suicidal and gender questioning              Marriage stress and disconnect, history of sexual relational difficulties              Covid stress              House renovation - legal problems with contractor     Treatment Objective(s) Addressed in This Session:   participate in family supports activities to improve mood  use relaxation strategies 2 times per day to reduce the physical symptoms of anxiety  Decrease frequency and intensity of feeling down, depressed, hopeless  Feel less tired and more energy during the day   track and record at least weekly pleasant exchanges with family  Therapy plan     Intervention:   CBT: How to handle house renovation stress together and put it aside as needed.   Interpersonal Therapy: What support is helpful. How to progress to more physical supports  extend sessions for 2 months                                ASSESSMENT: Current Emotional / Mental Status (status of significant symptoms):              Risk status (Self / Other harm or suicidal ideation)              Patient denies current fears or concerns for personal safety.              Patient denies current or recent suicidal ideation or behaviors.              Patient denies current or recent homicidal ideation or behaviors.              Patient denies current or recent self injurious behavior or ideation.              Patient denies other safety concerns.              Patient reports there has been no change in risk factors since their last session.                Patient reports there has been no change in protective factors since their last session.                Recommended that patient call 911 or go to the local ED should there be a change in any of these risk factors.                 Appearance:                normal  Eye Contact:                           not visible. on phone   Psychomotor Behavior:          visible. on phone  most of session  Attitude:                                   Cooperative  Friendly Pleasant  Orientation:                             All  Speech              Rate / Production:       Normal/ Responsive              Volume:                       Normal   Mood:                                      pleased, more hopeful, patient  Affect:                                      Appropriate   Thought Content:                    Clear   Thought Form:                        Coherent  Logical               Insight:                                     Good                  Medication Review:              No current psychiatric medications prescribed                 Medication Compliance:              NA                 Changes in Health Issues:              None reported                 Chemical Use Review:              Substance Use: Chemical use reviewed, no active concerns identified                  Tobacco Use: No current tobacco use.       Diagnosis:  1. Adjustment disorder with anxiety       Collateral Reports Completed:              Not Applicable     PLAN: (Patient Tasks / Therapist Tasks / Other)  Finish house project together. Communicate about touch with Jacqui as they begin. Sessions of touching for her pleasure. Time in room on Fridays. Sit together and build decision making forum. 10 min of touch and tenderness.        Juana Flores,                                                            ______________________________________________________________________     Treatment Plan     Patient's Name: Ricky Varela                     YOB: 1970     Date: 1/4/2021, Reviewed 6/16/2021     DSM5 Diagnoses:  Adjustment Disorders  309.24 (F43.22) With anxiety  Psychosocial & Contextual Factors: lack of intimacy, covid  WHODAS 2.0 (12 item):   WHODAS 2.0 Total Score 12/2/2020   Total Score MyChart 15         Referral / Collaboration:  Referral to another professional/service is not indicated at this time.     Anticipated number of session or this episode of  "care: 10        MeasurableTreatment Goal(s) related to diagnosis / functional impairment(s)  Goal 1: Patient will report more closeness and less stress.    I will know I've met my goal when their is more connection.       Objective #A (Patient Action)                          Patient will participate in affection activities to improve mood  use relaxation strategies 2 times per day to reduce the physical symptoms of anxiety  Increase interest, engagement, and pleasure in doing things  track and record at least 2 pleasant exchanges with wife.  Status: New - Date: 1/4/21, Reviewed 6/16/2021     Intervention(s)  Therapist will assign homework affection trading times  provide facilitate closeness.        Goal 2: Patient will less stressed with daughter's suicidal thoughts and gender stress.    I will know I've met my goal when know how to support my daughter.       Objective #A (Patient Action)                          Status: New - Date: 1/4/21  - Not an identified now 6/16/2021     Patient will identify 2 strategies to more effectively address stressors  use \"worry time\" each day for 15 minutes of scheduled worry and then defer obsessive or anxious thinking until the next structured worry time  track and record at least 2 pleasant exchanges with daughter.     Intervention(s)  Therapist will assign homework how to support daughter  role-play communication with daughter.     Patient has reviewed and agreed to the above plan.                 June 16, 2021      Juana Flores LP  July 28, 2021    "

## 2021-08-05 ENCOUNTER — TELEPHONE (OUTPATIENT)
Dept: URGENT CARE | Facility: CLINIC | Age: 51
End: 2021-08-05

## 2021-08-05 ENCOUNTER — OFFICE VISIT (OUTPATIENT)
Dept: INTERNAL MEDICINE | Facility: CLINIC | Age: 51
End: 2021-08-05
Payer: COMMERCIAL

## 2021-08-05 VITALS
HEART RATE: 78 BPM | OXYGEN SATURATION: 97 % | HEIGHT: 66 IN | BODY MASS INDEX: 24.17 KG/M2 | DIASTOLIC BLOOD PRESSURE: 84 MMHG | TEMPERATURE: 98.6 F | SYSTOLIC BLOOD PRESSURE: 125 MMHG | WEIGHT: 150.4 LBS

## 2021-08-05 DIAGNOSIS — M10.071 ACUTE IDIOPATHIC GOUT OF RIGHT ANKLE: Primary | ICD-10-CM

## 2021-08-05 DIAGNOSIS — E79.0 HYPERURICEMIA: ICD-10-CM

## 2021-08-05 PROCEDURE — 99214 OFFICE O/P EST MOD 30 MIN: CPT | Mod: GE | Performed by: STUDENT IN AN ORGANIZED HEALTH CARE EDUCATION/TRAINING PROGRAM

## 2021-08-05 RX ORDER — COLCHICINE 0.6 MG/1
0.6 TABLET ORAL DAILY
Qty: 20 TABLET | Refills: 3 | Status: SHIPPED | OUTPATIENT
Start: 2021-08-05 | End: 2022-11-10

## 2021-08-05 RX ORDER — ALLOPURINOL 100 MG/1
TABLET ORAL
Qty: 291 TABLET | Refills: 0 | Status: SHIPPED | OUTPATIENT
Start: 2021-08-05 | End: 2021-10-28

## 2021-08-05 ASSESSMENT — MIFFLIN-ST. JEOR: SCORE: 1479.96

## 2021-08-05 ASSESSMENT — PAIN SCALES - GENERAL: PAINLEVEL: NO PAIN (0)

## 2021-08-05 NOTE — PROGRESS NOTES
"                     [facility]       SUBJECTIVE:  Ricky Varela is a 50 year old male who comes in for acute gout flare ongoing for 1 week and wanting a rheumatology referral.     Has been having gout attacks for the last 4 years typically was once a year but now has been several times this year. He has been making dietary adjustments and has decreased alcohol intake but despite this has frequent flares. Currently has flare in his ankle (typical for him is ankle and big toe). He has no other joint involvement. He tried to wait it out but has been ongoing for a week and has been difficult for him to do his day to day routines and work. He has no colchicine which he has taken in the past at prescription of an urgent care doctor.     Medications and allergies reviewed by me today.     ROS:   Constitutional, neuro, ENT, endocrine, pulmonary, cardiac, gastrointestinal, genitourinary, musculoskeletal, integument and psychiatric systems are negative, except as otherwise noted.    OBJECTIVE:    /84   Pulse 78   Temp 98.6  F (37  C) (Oral)   Ht 1.676 m (5' 6\")   Wt 68.2 kg (150 lb 6.4 oz)   SpO2 97%   BMI 24.28 kg/m     Wt Readings from Last 1 Encounters:   08/05/21 68.2 kg (150 lb 6.4 oz)       GENERAL APPEARANCE: healthy, alert and no distress     EYES: EOMI,  PERRL     MSK/JOINT: Small effusion of the medial malleous without erythema, tender to palpation, no tophi or other effusions, joint pains specifically in foot, elbows, or knees.      SKIN: no suspicious lesions or rashes     PSYCH: mentation appears normal. and affect normal/bright     ASSESSMENT/PLAN:  Ricky was seen today for recheck medication and referral.    Diagnoses and all orders for this visit:    Acute idiopathic gout of right ankle  Hyperuricemia  Uric acid level earlier this month high at 8.6. Does have known gout not previously on urate lowering therapies for ppx. He does have an acute flare so at this point will give colchicine as " well as allopurinol however we will slowly up-titrate the allopurinol and give additional colchicine at the same time during initial ramp of the dose in order to prevent worsening/repeat flare. Counseled him on how to up-titrate his allopurinol. We will plan to aim for goal uric acid of <6 and he can come back in 3 months to re-check and will plan to increase allopurinol to achieve our target. He will continue to limit triggers in his diet. Calcium levels have been normal and he has no history of kidney stones to suggest that this is CPPD/pseudogout.   -     allopurinol (ZYLOPRIM) 100 MG tablet; Take 1 tablet (100 mg) by mouth daily for 7 days, THEN 2 tablets (200 mg) daily for 7 days, THEN 3 tablets (300 mg) daily.  -     colchicine (COLCYRS) 0.6 MG tablet; Take 1 tablet (0.6 mg) by mouth daily      Pt should return to clinic for f/u with me in 3 month for repeat uric acid level. Will plan to up-titrate allopurinol until Uric acid <6    Mirit Mohsen Yacoup, MD  Aug 5, 2021    Pt was seen and plan of care discussed with Dr Reymundo Mendoza.

## 2021-08-05 NOTE — NURSING NOTE
"50 year old  Chief Complaint   Patient presents with     Recheck Medication     Follow up on gout.     Referral     Requesting referral to rheumatology.       Blood pressure 125/84, pulse 78, temperature 98.6  F (37  C), temperature source Oral, height 1.676 m (5' 6\"), weight 68.2 kg (150 lb 6.4 oz), SpO2 97 %. Body mass index is 24.28 kg/m .  BP completed using cuff size:      Angeli Carl, MELLISSA  August 5, 2021 2:21 PM  "

## 2021-08-05 NOTE — TELEPHONE ENCOUNTER
[facility] Prior Authorization Team Request    Medication: colchicine 0.6mg tabs  Dosing: one tablet daily  Qty: 20  Day Supply: 20  NDC (required for Medicaid members): [address]-15     Insurance   BIN: [id number]   PCN: [id number]  Grp: [id number]  ID: [id number]    CoverMyMeds Key (if applicable):     Additional documentation:       Filling Pharmacy: [facility]  Phone Number: [phone]  Contact:    Pharmacy NPI (required for Medicaid members): [id number]

## 2021-08-05 NOTE — PATIENT INSTRUCTIONS
Primary Care Center Phone Number [phone]  [facility] Care Center Medication Refill Request Information:  * Please contact your pharmacy regarding ANY request for medication refills.  ** [facility] Prescription Fax = [phone]  * Please allow 3 business days for routine medication refills.  * Please allow 5 business days for controlled substance medication refills.     [facility] Test Result notification information:  *You will be notified with in 7-10 days of your appointment day regarding the results of your test.  If you are on MyChart you will be notified as soon as the provider has reviewed the results and signed off on them.

## 2021-08-06 ENCOUNTER — TELEPHONE (OUTPATIENT)
Dept: URGENT CARE | Facility: CLINIC | Age: 51
End: 2021-08-06

## 2021-08-06 NOTE — TELEPHONE ENCOUNTER
[facility] Prior Authorization Team Request    Medication: COLCHICINE 0.6MG TABS  Dosing:   NDC (required for Medicaid members):     Insurance   BIN: [id number]  PCN: [id number]  Grp: [id number]  ID: [id number]    CoverMyMeds Key (if applicable):     Additional documentation:     Filling Pharmacy: [facility]  Phone Number: [phone]  Contact: [employer] [place] (P [id number]) please send all responses to this pool.  Pharmacy NPI (required for Medicaid members):

## 2021-08-06 NOTE — TELEPHONE ENCOUNTER
Plan doesn't cover the generic Colchicine. They do cover the Brand Name Mitigare.       Prior Authorization Not Needed per Insurance    Medication: colchicine (COLCYRS) 0.6 MG tablet  Insurance Company: [employer] - Phone [phone] Fax [phone]  Expected CoPay: $30.00  Pharmacy Filling the Rx: [facility] [place], [place] - [address] [place] [address] [id number]  Pharmacy Notified: Yes - called pharmacy, confirmed they got the Mitigare processed for a $30.00 copay  Patient Notified: No

## 2021-08-06 NOTE — TELEPHONE ENCOUNTER
PRIOR AUTHORIZATION DENIED    Medication: colchicine (COLCYRS) 0.6 MG tablet    Denial Date: 8/6/2021    Denial Rational:         Appeal Information:

## 2021-08-06 NOTE — TELEPHONE ENCOUNTER
Central Prior Authorization Team   Phone: [phone]      PA Initiation    Medication: colchicine (COLCYRS) 0.6 MG tablet  Insurance Company: [employer] - Phone [phone] Fax [phone]  Pharmacy Filling the Rx: [facility] [place], [place] - [address] [place] [address] [id number]  Filling Pharmacy Phone: [phone]  Filling Pharmacy Fax:    Start Date: 8/6/2021

## 2021-09-11 ENCOUNTER — HEALTH MAINTENANCE LETTER (OUTPATIENT)
Age: 51
End: 2021-09-11

## 2021-10-05 ENCOUNTER — VIRTUAL VISIT (OUTPATIENT)
Dept: PSYCHOLOGY | Facility: CLINIC | Age: 51
End: 2021-10-05
Payer: COMMERCIAL

## 2021-10-05 DIAGNOSIS — F43.22 ADJUSTMENT DISORDER WITH ANXIETY: Primary | ICD-10-CM

## 2021-10-05 PROCEDURE — 90834 PSYTX W PT 45 MINUTES: CPT | Mod: GT | Performed by: PSYCHOLOGIST

## 2021-10-05 NOTE — PROGRESS NOTES
Discharge Summary  Multiple Sessions    Client Name: Ricky Varela MRN#: [id number] YOB: 1970    Discharge Date:   October 5, 2021    Service Modality: Video Visit:      Provider verified identity through the following two step process.  Patient provided:  Patient is known previously to provider    Telemedicine Visit: The patient's condition can be safely assessed and treated via synchronous audio and visual telemedicine encounter.      Reason for Telemedicine Visit: Services only offered telehealth    Originating Site (Patient Location): Patient's home    Distant Site (Provider Location): Provider's home office    Consent:  The patient/guardian has verbally consented to: the potential risks and benefits of telemedicine (video visit) versus in person care; bill my insurance or make self-payment for services provided; and responsibility for payment of non-covered services.     Patient would like the video invitation sent by:  My Chart    Mode of Communication:  Video Conference via [place]    As the provider I attest to compliance with applicable laws and regulations related to telemedicine.    Service Type: Family with client present      Session Start Time: 9:06  Session End Time: 9:50      Session Length: 45 - 50     Session #: 13     Attendees: Client and Spouse / Significant Other      Focus of Treatment Objective(s):  Client's presenting concerns included: Adjustment Difficulties related to: family concerns and Housing renovation stress. Today - daughter having difficulties in new school  Relational Problems related to: Conflict or difficulties with partner/spouse and Lack of intimacy and old wounds  Stage of Change at time of Discharge: MAINTENANCE (Working to maintain change, with risk of relapse)    Medication Adherence:  NA    Chemical Use:  NA    Assessment: Current Emotional / Mental Status (status of significant symptoms):    Risk status (Self / Other harm or suicidal  ideation)  Client denies current fears or concerns for personal safety.  Client denies current or recent suicidal ideation or behaviors.  Client denies current or recent homicidal ideation or behaviors.  Client denies current or recent self injurious behavior or ideation.  Client denies other safety concerns.  A safety and risk management plan has not been developed at this time, however client was given the after-hours number should there be a change in any of these risk factors.    Appearance:   Appropriate   Eye Contact:   Good   Psychomotor Behavior: Normal   Attitude:   Cooperative  Friendly Pleasant Attentive  Orientation:   All  Speech   Rate / Production: Normal/ Responsive Normal    Volume:  Normal   Mood:    Normal happy with outcome. confident  Affect:    Appropriate   Thought Content:  Clear   Thought Form:  Coherent  Logical   Insight:   Good     DSM5 Diagnoses:   DSM5 Diagnoses:  Adjustment Disorders  309.24 (F43.22) With anxiety  Psychosocial & Contextual Factors: lack of intimacy, legal stress with renovation, daughter MH problems  WHODAS 2.0 (12 item) Score: Not complete    Reason for Discharge:  Client is satisfied with progress and Goals completed      Aftercare Plan:  Client may resume counseling services at any time in the future by calling the [facility] Intake Office, [phone].  Client will participate in family events and adding intimacy as the space arises      Juana Flores, WAI  October 5, 2021

## 2021-10-25 DIAGNOSIS — M10.071 ACUTE IDIOPATHIC GOUT OF RIGHT ANKLE: ICD-10-CM

## 2021-10-28 RX ORDER — ALLOPURINOL 300 MG/1
300 TABLET ORAL DAILY
Qty: 30 TABLET | Refills: 1 | Status: SHIPPED | OUTPATIENT
Start: 2021-10-28 | End: 2021-11-18

## 2021-10-28 NOTE — TELEPHONE ENCOUNTER
Yes, should be on 300 mg daily at least until seen in clinic in November. Approved  Reymundo Mendoza MD   
allopurinol (ZYLOPRIM) 100 MG tablet  Last Written Prescription Date:  8/8/2021-11/17/2021  Last Fill Quantity: 291,   # refills: 0  Last Office Visit : 8/5/2021  Future Office visit:  11/18/2021    Routing refill request to provider for review/approval because:  Abnormal Uric Acid level  Also it looks like last order was increased gradually for Pt care.  Please advise on what dose Provider would like Pt to be taking at the present time.      Recent Labs   Lab Test 07/07/21  0905   URIC 8.6*       Vane Drummond RN  Central Triage Red Flags/Med Refills    
52

## 2021-11-18 ENCOUNTER — OFFICE VISIT (OUTPATIENT)
Dept: INTERNAL MEDICINE | Facility: CLINIC | Age: 51
End: 2021-11-18
Payer: COMMERCIAL

## 2021-11-18 VITALS
HEART RATE: 83 BPM | RESPIRATION RATE: 16 BRPM | TEMPERATURE: 98.2 F | WEIGHT: 150.9 LBS | SYSTOLIC BLOOD PRESSURE: 130 MMHG | DIASTOLIC BLOOD PRESSURE: 76 MMHG | OXYGEN SATURATION: 97 % | HEIGHT: 66 IN | BODY MASS INDEX: 24.25 KG/M2

## 2021-11-18 DIAGNOSIS — R29.91 MUSCULOSKELETAL SYMPTOMS REFERABLE TO LIMBS: Primary | ICD-10-CM

## 2021-11-18 DIAGNOSIS — M10.071 ACUTE IDIOPATHIC GOUT OF RIGHT ANKLE: ICD-10-CM

## 2021-11-18 PROCEDURE — 99214 OFFICE O/P EST MOD 30 MIN: CPT | Mod: GC | Performed by: STUDENT IN AN ORGANIZED HEALTH CARE EDUCATION/TRAINING PROGRAM

## 2021-11-18 RX ORDER — ALLOPURINOL 300 MG/1
300 TABLET ORAL DAILY
Qty: 30 TABLET | Refills: 3 | Status: SHIPPED | OUTPATIENT
Start: 2021-11-18 | End: 2022-04-20

## 2021-11-18 ASSESSMENT — MIFFLIN-ST. JEOR: SCORE: 1477.23

## 2021-11-18 ASSESSMENT — PAIN SCALES - GENERAL: PAINLEVEL: NO PAIN (0)

## 2021-11-18 NOTE — NURSING NOTE
Chief Complaint   Patient presents with     Recheck Medication     Patient comes in for a follow up.         Lm Wilder MA on 11/18/2021 at 9:07 AM

## 2021-11-18 NOTE — PROGRESS NOTES
"                     PRIMARY CARE CENTER       SUBJECTIVE:  Ricky Varela is a 51 year old male with a PMHx significant only for gout who comes in for 3 month follow up. He remains on allopurinol daily and has colchicine available for acute flares.     Doing very well. No flares since our last visit and since up-titrating to 300 mg of allopurinol.   Continues to work out daily with weight lifting. Has some shoulder pain and popping sometimes on his R shoulder and wonders if he is making it worse by working out. No other concerns today and overall doing well.     Medications and allergies reviewed by me today.     ROS:   Constitutional, neuro, ENT, endocrine, pulmonary, cardiac, gastrointestinal, genitourinary, musculoskeletal, integument and psychiatric systems are negative, except as otherwise noted.    OBJECTIVE:    /76 (BP Location: Right arm, Patient Position: Sitting, Cuff Size: Adult Regular)   Pulse 83   Temp 98.2  F (36.8  C) (Oral)   Resp 16   Ht 1.676 m (5' 6\")   Wt 68.4 kg (150 lb 14.4 oz)   SpO2 97%   BMI 24.36 kg/m     Wt Readings from Last 1 Encounters:   11/18/21 68.4 kg (150 lb 14.4 oz)       GENERAL APPEARANCE: healthy, alert and no distress     EYES: EOMI,  PERRL     RESP: lungs clear to auscultation - no rales, rhonchi or wheezes     CV: regular rates and rhythm, normal S1 S2, no S3 or S4 and no murmur, click or rub     ABDOMEN:  soft, nontender, no HSM or masses     MS: extremities normal- no gross deformities noted, no evidence of inflammation in joints, FROM in all extremities. Shoulder joints with equal and full range of motion. No notable crepitus palpated in the shoulder with movement.       SKIN: no suspicious lesions or rashes on exposed surfaces.     NEURO: ensory exam grossly normal, mentation intact and speech normal. Strength is intact and equal throughout.      PSYCH: mentation appears normal. and affect normal/bright     ASSESSMENT/PLAN:    Ricky was seen today for recheck " gout medication.    Diagnoses and all orders for this visit:    Musculoskeletal symptoms referable to limbs  Agreeable to PT for protective exercise and further instructions for while working out to help not cause injury to his shoulder.   -     Physical Therapy Referral; Future    Acute idiopathic gout of right ankle  Will recheck uric acid level now that he is on the increased dose of allopurinol for the last two months. Goal uric acid is 6 or less. Will also check kidney function given use of these meds. Hgb A1c to make sure he doesn't have a metabolic type syndrome with the gout and did previously have borderline fasting glucose readings.   -     allopurinol (ZYLOPRIM) 300 MG tablet; Take 1 tablet (300 mg) by mouth daily  -     Uric acid; Future  -     Basic metabolic panel; Future  -     Hemoglobin A1c; Future         Pt should return to clinic for f/u with me yearly or sooner as needed pending results.    Mirit Mohsen Yacoup, MD  Nov 18, 2021    Pt was seen and plan of care discussed with Dr Cynthia Lux.

## 2021-11-29 ENCOUNTER — LAB (OUTPATIENT)
Dept: LAB | Facility: CLINIC | Age: 51
End: 2021-11-29
Payer: COMMERCIAL

## 2021-11-29 DIAGNOSIS — M10.071 ACUTE IDIOPATHIC GOUT OF RIGHT ANKLE: ICD-10-CM

## 2021-11-29 LAB
ANION GAP SERPL CALCULATED.3IONS-SCNC: 5 MMOL/L (ref 3–14)
BUN SERPL-MCNC: 16 MG/DL (ref 7–30)
CALCIUM SERPL-MCNC: 9.6 MG/DL (ref 8.5–10.1)
CHLORIDE BLD-SCNC: 110 MMOL/L (ref 94–109)
CO2 SERPL-SCNC: 26 MMOL/L (ref 20–32)
CREAT SERPL-MCNC: 1.01 MG/DL (ref 0.66–1.25)
GFR SERPL CREATININE-BSD FRML MDRD: 85 ML/MIN/1.73M2
GLUCOSE BLD-MCNC: 117 MG/DL (ref 70–99)
HBA1C MFR BLD: 5.5 % (ref 0–5.6)
POTASSIUM BLD-SCNC: 5 MMOL/L (ref 3.4–5.3)
SODIUM SERPL-SCNC: 141 MMOL/L (ref 133–144)
URATE SERPL-MCNC: 4.6 MG/DL (ref 3.5–7.2)

## 2021-11-29 PROCEDURE — 80048 BASIC METABOLIC PNL TOTAL CA: CPT | Performed by: PATHOLOGY

## 2021-11-29 PROCEDURE — 83036 HEMOGLOBIN GLYCOSYLATED A1C: CPT | Performed by: PATHOLOGY

## 2021-11-29 PROCEDURE — 36415 COLL VENOUS BLD VENIPUNCTURE: CPT | Performed by: PATHOLOGY

## 2021-11-29 PROCEDURE — 84550 ASSAY OF BLOOD/URIC ACID: CPT | Performed by: PATHOLOGY

## 2021-12-30 ENCOUNTER — HOSPITAL ENCOUNTER (OUTPATIENT)
Dept: PHYSICAL THERAPY | Facility: CLINIC | Age: 51
Setting detail: THERAPIES SERIES
End: 2021-12-30
Attending: INTERNAL MEDICINE
Payer: COMMERCIAL

## 2021-12-30 DIAGNOSIS — R29.91 MUSCULOSKELETAL SYMPTOMS REFERABLE TO LIMBS: ICD-10-CM

## 2021-12-30 PROCEDURE — 97110 THERAPEUTIC EXERCISES: CPT | Mod: GP | Performed by: PHYSICAL THERAPIST

## 2021-12-30 PROCEDURE — 97161 PT EVAL LOW COMPLEX 20 MIN: CPT | Mod: GP | Performed by: PHYSICAL THERAPIST

## 2021-12-30 NOTE — PROGRESS NOTES
12/30/21 4849   General Information   Type of Visit Initial OP Ortho PT Evaluation   Start of Care Date 12/30/21   Referring Physician Dr. Jorgensen   Patient/Family Goals Statement Pt hopes to be instructed in HEP to reduce risk of injury to his R shoulder   Orders Evaluate and Treat   Date of Order 11/18/21   Certification Required? No   Medical Diagnosis Musculoskeletal symptoms referable to limbs R29.91    Surgical/Medical history reviewed Yes   Precautions/Limitations no known precautions/limitations       Present No   Body Part(s)   Body Part(s) Shoulder   Presentation and Etiology   Pertinent history of current problem (include personal factors and/or comorbidities that impact the POC) Pt presents to PT to address chronic R shoulder pain.  He indicates insidious onset over the past couple years. He indicates pain with prolonged laying on his R side and weight lifting, especially overhead pressing, notcing crepitus/clicking of his shoulder joint.  He exercises 5 days/wk, combo of aerobic activity and weight lifting.  Pain 0/10 at rest, typicaly 2/10 with exercise but does increase with certain movements/repetetive motions.   Impairments A. Pain;D. Decreased ROM;F. Decreased strength and endurance   Functional Limitations perform desired leisure / sports activities   Symptom Location R shoulder   How/Where did it occur From insidious onset;With repetition/overuse   Onset date of current episode/exacerbation 12/30/19  (pt estimates 2 yrs ago, insidious onset)   Chronicity Chronic   Pain rating (0-10 point scale)   (0/10 at rest, 2/10 with movement)   Pain quality A. Sharp;B. Dull   Frequency of pain/symptoms C. With activity  (overhead press/lift)   Pain/symptoms exacerbated by C. Lifting;H. Overhead reach;M. Other  (overhead press)   Pain/symptoms eased by C. Rest   Progression of symptoms since onset: Worsened   Prior Level of Function   Prior Level of Function-Mobility Independent    Prior Level of Function-ADLs Independent   Functional Level Prior Comment Independent PLOF, active, exercises most days/wk, combo of strength training and aerobic exercise.   Current Level of Function   Current Community Support   (spouse)   Patient role/employment history A. 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 No   Have you fallen and had an injury in the past year? No   Is patient a fall risk? No   Fall screen comments low fall risk per gait assessment   Abuse Screen (yes response referral indicated)   Feels Unsafe at Home or Work/School no   Feels Threatened by Someone no   Does Anyone Try to Keep You From Having Contact with Others or Doing Things Outside Your Home? no   Physical Signs of Abuse Present no   Shoulder Objective Findings   Side (if bilateral, select both right and left) Right   Integumentary  unremarkable   Posture WNL posture, minimal FH/rounded shoulders noted, able to actively engage scapular retractors and correct posture with cues, mild tendency for upper trap hike   Cervical Screen (ROM, quadrant) WNL   Scapulothoracic Rhythm WFL and grossly symmetrical rhythm noted, mild UT hike/overengagement with overhead lifting   Shoulder Flexibility Comments patient subjectively feels lack of AROM/tightness about R shoulder, objectively only lacking a couple degrees AROM compared to L shoulder   Palpation Mild TTP R anterior shoulder about biciptial groove and greater humeral tubercle   Accessory Motion/Joint Mobility mild UT compensation/hike noted initially with overhead AROM of shoulders   Right Shoulder Flexion AROM 156  (L Shoulder AROM flexion 158 deg)   Right Shoulder Abduction AROM 155  (L shoulder  AROM abduction 158 deg)   Right Shoulder IR AROM   (2.5 inch difference in IR thumb to spine test, lacking on R)   Right Shoulder Flexion Strength 5/5  (asymptomatic)   Right Shoulder Abduction Strength 5/5  (asymptomatic)   Right Shoulder ER Strength 5/5  (asymptomatic)   Right Shoulder IR Strength 5/5  (asymptomatic)   Right Shoulder Extension Strength 5/5  (asymptomatic)   Planned Therapy Interventions   Planned Therapy Interventions manual therapy;ROM;strengthening;stretching;joint mobilization   Planned Modality Interventions   Planned Modality Interventions Comments as indicated per therapist discretion   Clinical Impression   Criteria for Skilled Therapeutic Interventions Met yes, treatment indicated   PT Diagnosis R shoulder dysfunction and impaired activity tolerance   Influenced by the following impairments R shoulder pain, crepitus, tightness   Functional limitations due to impairments Reduced tolerance with overhead press/strengthening, shoulder pain with exercise and laying on his R side   Clinical Presentation Stable/Uncomplicated   Clinical Presentation Rationale stable symptoms, age, independent/active PLOF, pain level   Clinical Decision Making (Complexity) Low complexity   Therapy Frequency other (see comments)  (patient preferring reduced frequency, 1x/month)   Predicted Duration of Therapy Intervention (days/wks) 2-3 months   Risk & Benefits of therapy have been explained Yes   Patient, Family & other staff in agreement with plan of care Yes   Clinical Impression Comments Evaluation reveals mild R shoulder pain and crepitus, TTP about R bicipital groove and greater humeral tubercle.  5/5 strength throughout shoulder without pain reproduction on this date, AROM grossly symmetrical to L shoulder, besides mild IR deficit/tightness.  Will benefit from HEP instruction for longterm management of condition.   Education Assessment   Barriers to Learning No barriers   ORTHO GOALS   PT Ortho Eval Goals 1;2    Ortho Goal 1   Goal Identifier Pain   Goal Description Pt will report/demo ability to perfrom overhead press with moderate weight without reproduction of shoulder pain for improved tolerance with leisure activity and exercise.   Target Date 03/30/22   Ortho Goal 2   Goal Identifier HEP   Goal Description Pt will demonstrate independence with longterm HEP to manage condition and optimize function.   Target Date 03/30/22   Total Evaluation Time   PT Eval, Low Complexity Minutes (88128) 25

## 2022-01-26 ENCOUNTER — HOSPITAL ENCOUNTER (OUTPATIENT)
Dept: PHYSICAL THERAPY | Facility: CLINIC | Age: 52
Setting detail: THERAPIES SERIES
End: 2022-01-26
Payer: COMMERCIAL

## 2022-01-26 DIAGNOSIS — M25.511 CHRONIC RIGHT SHOULDER PAIN: ICD-10-CM

## 2022-01-26 DIAGNOSIS — R29.91 MUSCULOSKELETAL SYMPTOMS REFERABLE TO LIMBS: Primary | ICD-10-CM

## 2022-01-26 DIAGNOSIS — G89.29 CHRONIC RIGHT SHOULDER PAIN: ICD-10-CM

## 2022-01-26 PROCEDURE — 97110 THERAPEUTIC EXERCISES: CPT | Mod: GP | Performed by: PHYSICAL THERAPIST

## 2022-04-18 DIAGNOSIS — M10.071 ACUTE IDIOPATHIC GOUT OF RIGHT ANKLE: ICD-10-CM

## 2022-04-20 RX ORDER — ALLOPURINOL 300 MG/1
1 TABLET ORAL DAILY
Qty: 90 TABLET | Refills: 1 | Status: SHIPPED | OUTPATIENT
Start: 2022-04-20 | End: 2022-11-04

## 2022-08-13 ENCOUNTER — HEALTH MAINTENANCE LETTER (OUTPATIENT)
Age: 52
End: 2022-08-13

## 2022-10-29 ENCOUNTER — HEALTH MAINTENANCE LETTER (OUTPATIENT)
Age: 52
End: 2022-10-29

## 2022-10-31 DIAGNOSIS — M10.071 ACUTE IDIOPATHIC GOUT OF RIGHT ANKLE: ICD-10-CM

## 2022-11-04 RX ORDER — ALLOPURINOL 300 MG/1
1 TABLET ORAL DAILY
Qty: 90 TABLET | Refills: 0 | Status: SHIPPED | OUTPATIENT
Start: 2022-11-04 | End: 2023-02-10

## 2022-11-10 ENCOUNTER — LAB (OUTPATIENT)
Dept: LAB | Facility: CLINIC | Age: 52
End: 2022-11-10
Payer: COMMERCIAL

## 2022-11-10 ENCOUNTER — OFFICE VISIT (OUTPATIENT)
Dept: INTERNAL MEDICINE | Facility: CLINIC | Age: 52
End: 2022-11-10
Payer: COMMERCIAL

## 2022-11-10 VITALS
OXYGEN SATURATION: 98 % | HEART RATE: 64 BPM | DIASTOLIC BLOOD PRESSURE: 87 MMHG | SYSTOLIC BLOOD PRESSURE: 135 MMHG | HEIGHT: 66 IN | WEIGHT: 153.5 LBS | BODY MASS INDEX: 24.67 KG/M2

## 2022-11-10 DIAGNOSIS — Z00.00 ANNUAL PHYSICAL EXAM: ICD-10-CM

## 2022-11-10 DIAGNOSIS — Z00.00 ANNUAL PHYSICAL EXAM: Primary | ICD-10-CM

## 2022-11-10 DIAGNOSIS — E78.5 HYPERLIPIDEMIA WITH TARGET LDL LESS THAN 160: ICD-10-CM

## 2022-11-10 LAB
ALBUMIN UR-MCNC: NEGATIVE MG/DL
ANION GAP SERPL CALCULATED.3IONS-SCNC: 13 MMOL/L (ref 7–15)
APPEARANCE UR: CLEAR
BILIRUB UR QL STRIP: NEGATIVE
BUN SERPL-MCNC: 9.5 MG/DL (ref 6–20)
CALCIUM SERPL-MCNC: 10 MG/DL (ref 8.6–10)
CHLORIDE SERPL-SCNC: 101 MMOL/L (ref 98–107)
CHOLEST SERPL-MCNC: 293 MG/DL
COLOR UR AUTO: NORMAL
CREAT SERPL-MCNC: 1.04 MG/DL (ref 0.67–1.17)
CRP SERPL-MCNC: <3 MG/L
DEPRECATED HCO3 PLAS-SCNC: 26 MMOL/L (ref 22–29)
ERYTHROCYTE [SEDIMENTATION RATE] IN BLOOD BY WESTERGREN METHOD: 9 MM/HR (ref 0–20)
GFR SERPL CREATININE-BSD FRML MDRD: 86 ML/MIN/1.73M2
GLUCOSE SERPL-MCNC: 92 MG/DL (ref 70–99)
GLUCOSE UR STRIP-MCNC: NEGATIVE MG/DL
HBA1C MFR BLD: 5.5 %
HDLC SERPL-MCNC: 38 MG/DL
HGB UR QL STRIP: NEGATIVE
KETONES UR STRIP-MCNC: NEGATIVE MG/DL
LDLC SERPL CALC-MCNC: ABNORMAL MG/DL
LEUKOCYTE ESTERASE UR QL STRIP: NEGATIVE
NITRATE UR QL: NEGATIVE
NONHDLC SERPL-MCNC: 255 MG/DL
PH UR STRIP: 6 [PH] (ref 5–7)
POTASSIUM SERPL-SCNC: 4.4 MMOL/L (ref 3.4–5.3)
PSA SERPL-MCNC: 4.76 NG/ML (ref 0–3.5)
RBC URINE: 1 /HPF
SODIUM SERPL-SCNC: 140 MMOL/L (ref 136–145)
SP GR UR STRIP: 1.01 (ref 1–1.03)
TRIGL SERPL-MCNC: 504 MG/DL
UROBILINOGEN UR STRIP-MCNC: NORMAL MG/DL
WBC URINE: <1 /HPF

## 2022-11-10 PROCEDURE — 86038 ANTINUCLEAR ANTIBODIES: CPT | Mod: 90 | Performed by: PATHOLOGY

## 2022-11-10 PROCEDURE — 36415 COLL VENOUS BLD VENIPUNCTURE: CPT | Performed by: PATHOLOGY

## 2022-11-10 PROCEDURE — 82550 ASSAY OF CK (CPK): CPT | Mod: 90 | Performed by: PATHOLOGY

## 2022-11-10 PROCEDURE — 85652 RBC SED RATE AUTOMATED: CPT | Performed by: PATHOLOGY

## 2022-11-10 PROCEDURE — G0103 PSA SCREENING: HCPCS | Performed by: PATHOLOGY

## 2022-11-10 PROCEDURE — 86140 C-REACTIVE PROTEIN: CPT | Performed by: PATHOLOGY

## 2022-11-10 PROCEDURE — 99000 SPECIMEN HANDLING OFFICE-LAB: CPT | Performed by: PATHOLOGY

## 2022-11-10 PROCEDURE — 99396 PREV VISIT EST AGE 40-64: CPT | Mod: GC | Performed by: STUDENT IN AN ORGANIZED HEALTH CARE EDUCATION/TRAINING PROGRAM

## 2022-11-10 PROCEDURE — 81001 URINALYSIS AUTO W/SCOPE: CPT | Performed by: PATHOLOGY

## 2022-11-10 PROCEDURE — 80061 LIPID PANEL: CPT | Performed by: PATHOLOGY

## 2022-11-10 PROCEDURE — 83036 HEMOGLOBIN GLYCOSYLATED A1C: CPT | Mod: 90 | Performed by: PATHOLOGY

## 2022-11-10 PROCEDURE — 80053 COMPREHEN METABOLIC PANEL: CPT | Performed by: PATHOLOGY

## 2022-11-10 PROCEDURE — 82248 BILIRUBIN DIRECT: CPT | Mod: 90 | Performed by: PATHOLOGY

## 2022-11-10 ASSESSMENT — ENCOUNTER SYMPTOMS
SKIN CHANGES: 0
LIGHT-HEADEDNESS: 0
ARTHRALGIAS: 0
BACK PAIN: 0
INSOMNIA: 0
HEMATURIA: 0
JOINT SWELLING: 0
LEG PAIN: 0
EXERCISE INTOLERANCE: 0
DEPRESSION: 0
PANIC: 0
FLANK PAIN: 0
EYE PAIN: 1
MYALGIAS: 1
EYE WATERING: 1
DYSURIA: 0
NERVOUS/ANXIOUS: 0
MUSCLE CRAMPS: 1
PALPITATIONS: 0
EYE REDNESS: 0
DECREASED CONCENTRATION: 0
SLEEP DISTURBANCES DUE TO BREATHING: 0
ORTHOPNEA: 0
POOR WOUND HEALING: 0
DOUBLE VISION: 0
HYPERTENSION: 0
EYE IRRITATION: 1
DIFFICULTY URINATING: 0
HYPOTENSION: 0
SYNCOPE: 0
NAIL CHANGES: 0
MUSCLE WEAKNESS: 0
STIFFNESS: 1
NECK PAIN: 0

## 2022-11-10 NOTE — NURSING NOTE
Ricky Varela is a 52 year old male patient that presents today in clinic for the following:    Chief Complaint   Patient presents with     Physical     Pt here for routine for physical     The patient's allergies and medications were reviewed as noted. A set of vitals were recorded as noted without incident. The patient does not have any other questions for the provider.    Yamilka Padilla, EMT at 1:05 PM on 11/10/2022

## 2022-11-10 NOTE — PROGRESS NOTES
CC:  Chief Complaint   Patient presents with     Physical     Pt here for routine for physical         HPI:  51 yo M w/ a past medical history of gout. He presents to clinic today for a routine physical. He reports no new complaints. He has not had any new episodes of joint pains since starting allopurinol but notes joint stiffness I his hands. Joint stiffness in his PIP joints, worse on the left hand,  lasts ~2 hrs after waking up before resolving. No associated joint swelling.    He reports increased urinary frequency. No nocturia, no hesitancy, no weak stream, no dribbling, no sensation of incomplete emptying. No increased thirst. No hematuria. Patient reports patches of hair loss on his scalp which self resolves.    ROS:  Answers for HPI/ROS submitted by the patient on 11/10/2022  General Symptoms: No  Skin Symptoms: Yes  HENT Symptoms: No  EYE SYMPTOMS: Yes  HEART SYMPTOMS: Yes  LUNG SYMPTOMS: No  INTESTINAL SYMPTOMS: No  URINARY SYMPTOMS: Yes  REPRODUCTIVE SYMPTOMS: No  SKELETAL SYMPTOMS: Yes  BLOOD SYMPTOMS: No  NERVOUS SYSTEM SYMPTOMS: No  MENTAL HEALTH SYMPTOMS: Yes  Changes in hair: Yes  Changes in moles/birth marks: No  Itching: No  Rashes: No  Changes in nails: No  Acne: No  Change in facial hair: No  Warts: No  Non-healing sores: No  Scarring: No  Flaking of skin: No  Color changes of hands/feet in cold : No  Sun sensitivity: No  Skin thickening: No  Eye pain: Yes  Vision loss: No  Dry eyes: No  Watery eyes: Yes  Eye bulging: No  Double vision: No  Flashing of lights: No  Spots: Yes  Floaters: Yes  Redness: No  Crossed eyes: No  Tunnel Vision: No  Yellowing of eyes: No  Eye irritation: Yes  Chest pain or pressure: No  Fast or irregular heartbeat: No  Pain in legs with walking: No  Trouble breathing while lying down: No  Fingers or toes appear blue: No  High blood pressure: No  Low blood pressure: No  Fainting: No  Murmurs: No  Pacemaker: No  Varicose veins: No  Edema or swelling: No  Wake up at night  "with shortness of breath: No  Light-headedness: No  Exercise intolerance: No  Trouble holding urine or incontinence: No  Pain or burning: No  Trouble starting or stopping: No  Increased frequency of urination: Yes  Blood in urine: No  Decreased frequency of urination: No  Frequent nighttime urination: No  Flank pain: No  Difficulty emptying bladder: No  Back pain: No  Muscle aches: Yes  Neck pain: No  Swollen joints: No  Joint pain: No  Bone pain: No  Muscle cramps: Yes  Muscle weakness: No  Joint stiffness: Yes  Bone fracture: No  Nervous or Anxious: No  Depression: No  Trouble sleeping: No  Trouble thinking or concentrating: No  Mood changes: Yes  Panic attacks: No                Past Medical History:   Diagnosis Date     Alopecia areata     recurrent     Hyperlipidemia LDL goal < 160      Past Surgical History:   Procedure Laterality Date     CIRCUMCISION  7/8/2014    Procedure: CIRCUMCISION;  Surgeon: Tomas Francisco MD;  Location: [facility]     COLONOSCOPY  2015    NORMAL, repeat in 2020     VASECTOMY       Family History   Problem Relation Age of Onset     Diabetes Mother      Cancer - colorectal Father 64     Diabetes Brother      Social History     Tobacco Use     Smoking status: Never     Smokeless tobacco: Never   Substance Use Topics     Alcohol use: No     Drug use: No     Current Outpatient Medications   Medication Sig Dispense Refill     allopurinol (ZYLOPRIM) 300 MG tablet Take 1 tablet (300 mg) by mouth daily 90 tablet 0        Allergies   Allergen Reactions     Atorvastatin Muscle Pain (Myalgia)     Dog Fennel Allergy Skin Test Itching     Ragweeds Other (See Comments)     rhinitis         /87 (BP Location: Right arm, Patient Position: Sitting, Cuff Size: Adult Regular)   Pulse 64   Ht 1.676 m (5' 6\")   Wt 69.6 kg (153 lb 8 oz)   SpO2 98%   BMI 24.78 kg/m    Physical Examination:    General:  Conversant, generally healthy appearing, no acute distress  Head: atraumatic  Eyes:  Pupils 2-3 " mm, sclera white, EOM's full, conjunctiva moist, no periorbital swelling    Throat/Mouth:  No pharyngeal erythema, exudate, ulcers, oral mucosa and tongue moist, normal hard and soft palate  Lungs:  Clear to auscultation throughout, no wheezes, rhonchi or rales. Normal respiratory effort and no intercostal retractions.  C/V:  Regular rate and rhythm, no murmurs, rubs or gallops.  No JVD. Radial and DP pulses 2+, equal and regular.  Abdomen:  Not distended.  No tenderness, no hepatosplenomegaly or masses.    Neuro: Alert and oriented, face symmetric. Able to get on/off exam table without assistance.  Strength grossly intact. No tremor.  Gait steady.   M/S:   Fixed flexion of PIP of left middle finger. No joint swelling or tenderness.  Skin:   Normal temperature., turgor and texture. No rashes, suspicious lesions, or jaundice on exposed skin surfaces.   Extremities:  No peripheral edema, no digital cyanosis  Psych:  Alert and oriented. Appropriate affect.  Not psychomotor slowed.  No signs of anxiety or agitation.      A&P:    Ricky was seen today for physical.    Diagnoses and all orders for this visit:    Annual physical exam  Joint stiffness  Urinary frequency  Hyperlipidemia  Concern for possible inflammatory joint disease given complaints of joint stiffness lasting ~2hrs and flexion deformity on exam  -     Hemoglobin A1c; given complaints of urinary frequency   -     PSA screen; given complaints of urinary frequency   -     Erythrocyte sedimentation rate; given concern for inflammatory joint disease  -     CRP inflammation; given concern for inflammatory joint disease  -     Lipid Profile NON-FASTING; Future  -     Basic metabolic panel; Future  -     UA with Micro reflex to Culture; given complaints of urinary frequency   -     Anti Nuclear Jena IgG by IFA with Reflex; given concern for inflammatory joint disease      Patient staffed with Dr. Kaiser.    Leo Guerrero MD  Internal Medicine Resident PGY 3  Pager:  [phone]

## 2022-11-11 ENCOUNTER — TELEPHONE (OUTPATIENT)
Dept: UROLOGY | Facility: CLINIC | Age: 52
End: 2022-11-11

## 2022-11-11 ENCOUNTER — MYC MEDICAL ADVICE (OUTPATIENT)
Dept: INTERNAL MEDICINE | Facility: CLINIC | Age: 52
End: 2022-11-11

## 2022-11-11 DIAGNOSIS — E78.5 HYPERLIPIDEMIA WITH TARGET LDL LESS THAN 160: Primary | ICD-10-CM

## 2022-11-11 DIAGNOSIS — R97.20 ELEVATED PROSTATE SPECIFIC ANTIGEN (PSA): Primary | ICD-10-CM

## 2022-11-11 LAB
ALBUMIN SERPL BCG-MCNC: 5 G/DL (ref 3.5–5.2)
ALP SERPL-CCNC: 73 U/L (ref 40–129)
ALT SERPL W P-5'-P-CCNC: 43 U/L (ref 10–50)
ANA SER QL IF: NEGATIVE
AST SERPL W P-5'-P-CCNC: 36 U/L (ref 10–50)
BILIRUB DIRECT SERPL-MCNC: <0.2 MG/DL (ref 0–0.3)
BILIRUB SERPL-MCNC: 0.3 MG/DL
CK SERPL-CCNC: 422 U/L (ref 39–308)
PROT SERPL-MCNC: 7.7 G/DL (ref 6.4–8.3)

## 2022-11-11 RX ORDER — ROSUVASTATIN CALCIUM 10 MG/1
10 TABLET, COATED ORAL AT BEDTIME
Qty: 90 TABLET | Refills: 0 | Status: SHIPPED | OUTPATIENT
Start: 2022-11-11 | End: 2023-02-10

## 2022-11-11 NOTE — TELEPHONE ENCOUNTER
[facility] Call Center    Phone Message    May a detailed message be left on voicemail: yes     Reason for Call: Appointment Intake    Referring Provider Name: Cait Kaiser MD   Diagnosis and/or Symptoms: Elevated prostate specific antigen (PSA)    Pt has an urgent referral for dx above. Writer unable to find an appt for dx above. Please review and advise pt when scheduling. Thanks    Action Taken: Message routed to:  [facility] ([facility] uro    Travel Screening: Not Applicable

## 2022-11-14 DIAGNOSIS — E78.5 HYPERLIPIDEMIA WITH TARGET LDL LESS THAN 160: Primary | ICD-10-CM

## 2022-11-14 NOTE — TELEPHONE ENCOUNTER
Call to assist patient to schedule with any [facility] for soonest available. Not urgent. Patient schedule VV with Belen Lamb for 12/13/22 at 4:00 PM.

## 2022-11-15 ENCOUNTER — PRE VISIT (OUTPATIENT)
Dept: UROLOGY | Facility: CLINIC | Age: 52
End: 2022-11-15

## 2022-11-15 NOTE — TELEPHONE ENCOUNTER
Reason for visit: consult      Dx/Hx/Sx: elevated PSA    Records/imaging/labs/orders: in epic     At Rooming: video visit

## 2022-12-13 ENCOUNTER — VIRTUAL VISIT (OUTPATIENT)
Dept: UROLOGY | Facility: CLINIC | Age: 52
End: 2022-12-13
Payer: COMMERCIAL

## 2022-12-13 DIAGNOSIS — R97.20 ELEVATED PROSTATE SPECIFIC ANTIGEN (PSA): Primary | ICD-10-CM

## 2022-12-13 PROCEDURE — 99203 OFFICE O/P NEW LOW 30 MIN: CPT | Mod: 95 | Performed by: NURSE PRACTITIONER

## 2022-12-13 NOTE — PROGRESS NOTES
Ricky is a 52 year old who is being evaluated via a billable video visit.      How would you like to obtain your AVS? MyChart  If the video visit is dropped, the invitation should be resent by: Text to cell phone: [phone]  Will anyone else be joining your video visit? No    Video-Visit Details    Video Start Time: 3:46 PM    Type of service:  Video Visit    Video End Time:4:00 PM    Originating Location (pt. Location): Work    Distant Location (provider location):  Off-site    Platform used for Video Visit: [facility]       Ricky Varela complains of   Chief Complaint   Patient presents with     Consult     Elevated PSA       Assessment/Plan:  52 year old male with initial PSA checked one month ago elevated at 4.76. We discussed PSA in detail today, including the need for trends to help understand significance of an elevated level. He is due for updated general health labs, and will be having a blood draw in the coming 2-3 months. Will plan to update his PSA at that time, as well. I will be in touch with him regarding this result, once complete, and next steps. If his PSA drops to normal range, will continue to follow. If his PSA remains at the current level or trends higher, may consider prostate MRI for further eval.     Belen Lamb, CNP  Department of Urology      Subjective:   52 year old male who presents for virtual consult regarding elevated PSA. This was checked on 11/10/22 and was 4.76. Today, he denies any known family history of prostate issues or cancer. He generally feels he is voiding fine without much issue. He does acknowledge voiding a bit more frequently than he used to, which he attributes to age.     He is a university  in pediatrics.       Objective:     Exam:   Patient is a 52 year old male   No vital signs obtained due to virtual visit.  General Appearance: Well groomed, hygenic  Respiratory: No cough, no respiratory distress or labored breathing  Musculoskeletal: Grossly  normal with no gross deficits  Skin: No discoloration or apparent rashes  Neurologic: No tremors  Psychiatric: Alert and oriented  Further examination is deferred due to the nature of our visit.

## 2022-12-13 NOTE — LETTER
12/13/2022       RE: Ricky Varela  [address]  [place] [address]     Dear Colleague,    Thank you for referring your patient, Ricky Varela, to the [facility] [place] [facility]. Please see a copy of my visit note below.    Ricky is a 52 year old who is being evaluated via a billable video visit.      How would you like to obtain your AVS? MyChart  If the video visit is dropped, the invitation should be resent by: Text to cell phone: [phone]  Will anyone else be joining your video visit? No    Video-Visit Details    Video Start Time: 3:46 PM    Type of service:  Video Visit    Video End Time:4:00 PM    Originating Location (pt. Location): Work    Distant Location (provider location):  Off-site    Platform used for Video Visit: [employer]       Ricky Varela complains of   Chief Complaint   Patient presents with     Consult     Elevated PSA       Assessment/Plan:  52 year old male with initial PSA checked one month ago elevated at 4.76. We discussed PSA in detail today, including the need for trends to help understand significance of an elevated level. He is due for updated general health labs, and will be having a blood draw in the coming 2-3 months. Will plan to update his PSA at that time, as well. I will be in touch with him regarding this result, once complete, and next steps. If his PSA drops to normal range, will continue to follow. If his PSA remains at the current level or trends higher, may consider prostate MRI for further eval.     Belen Lamb, CNP  Department of Urology      Subjective:   52 year old male who presents for virtual consult regarding elevated PSA. This was checked on 11/10/22 and was 4.76. Today, he denies any known family history of prostate issues or cancer. He generally feels he is voiding fine without much issue. He does acknowledge voiding a bit more frequently than he used to, which he  attributes to age.     He is a university  in pediatrics.       Objective:     Exam:   Patient is a 52 year old male   No vital signs obtained due to virtual visit.  General Appearance: Well groomed, hygenic  Respiratory: No cough, no respiratory distress or labored breathing  Musculoskeletal: Grossly normal with no gross deficits  Skin: No discoloration or apparent rashes  Neurologic: No tremors  Psychiatric: Alert and oriented  Further examination is deferred due to the nature of our visit.

## 2022-12-13 NOTE — PATIENT INSTRUCTIONS
UROLOGY CLINIC VISIT PATIENT INSTRUCTIONS    Will plan to check a repeat PSA in a few months. I have placed this order in your chart. I will be in touch with you regarding this result, once complete, and next steps.     If you have any issues, questions or concerns in the meantime, do not hesitate to contact us at [phone] or via [email or web address].     Belen Lamb, CNP  Department of Urology

## 2022-12-20 ENCOUNTER — OFFICE VISIT (OUTPATIENT)
Dept: INTERNAL MEDICINE | Facility: CLINIC | Age: 52
End: 2022-12-20
Payer: COMMERCIAL

## 2022-12-20 ENCOUNTER — ANCILLARY PROCEDURE (OUTPATIENT)
Dept: GENERAL RADIOLOGY | Facility: CLINIC | Age: 52
End: 2022-12-20
Attending: PEDIATRICS
Payer: COMMERCIAL

## 2022-12-20 VITALS
HEART RATE: 74 BPM | BODY MASS INDEX: 25.38 KG/M2 | DIASTOLIC BLOOD PRESSURE: 85 MMHG | SYSTOLIC BLOOD PRESSURE: 122 MMHG | HEIGHT: 66 IN | WEIGHT: 157.9 LBS | OXYGEN SATURATION: 99 %

## 2022-12-20 DIAGNOSIS — M25.562 ACUTE PAIN OF LEFT KNEE: ICD-10-CM

## 2022-12-20 DIAGNOSIS — M25.562 ACUTE PAIN OF LEFT KNEE: Primary | ICD-10-CM

## 2022-12-20 PROCEDURE — 73562 X-RAY EXAM OF KNEE 3: CPT | Mod: LT | Performed by: RADIOLOGY

## 2022-12-20 PROCEDURE — 99213 OFFICE O/P EST LOW 20 MIN: CPT | Mod: GC | Performed by: STUDENT IN AN ORGANIZED HEALTH CARE EDUCATION/TRAINING PROGRAM

## 2022-12-20 NOTE — PROGRESS NOTES
"I, Sergio Garcia MD saw the patient with the resident, and agree with the resident's findings and plan of care as documented in the resident's note.  /85 (BP Location: Right arm, Patient Position: Sitting, Cuff Size: Adult Regular)   Pulse 74   Ht 1.676 m (5' 5.98\")   Wt 71.6 kg (157 lb 14.4 oz)   SpO2 99%   BMI 25.50 kg/m    I personally reviewed vital signs and past record.  Key findings: hx gout, slipped on floor and has knee problem. Agree with imaging. Will need PT regardless    "

## 2022-12-20 NOTE — NURSING NOTE
"Ricky Varela is a 52 year old male patient that presents today in clinic for the following:    Chief Complaint   Patient presents with     Knee Pain     Pt's most recent \"flare-up\" was about 3 weeks ago but is not in pain right now.     The patient's allergies and medications were reviewed as noted. A set of vitals were recorded as noted without incident: /85 (BP Location: Right arm, Patient Position: Sitting, Cuff Size: Adult Regular)   Pulse 74   Ht 1.676 m (5' 5.98\")   Wt 71.6 kg (157 lb 14.4 oz)   SpO2 99%   BMI 25.50 kg/m  . The patient does not have any other questions for the provider.    Nancy Juares, EMT at 1:45 PM on 12/20/2022  "

## 2022-12-21 NOTE — PROGRESS NOTES
"  PRIMARY [facility]ARE CENTER         HPI:       Ricky Vaerla is a 52 year old male who presents to clinic for: Knee Pain (Pt's most recent \"flare-up\" was about 3 weeks ago but is not in pain right now.)    Ricky Varela is a 52-year-old man with history of dysplipidemia, dog allergy, vasectomy, and gout, who presents to clinic for L knee pain.    As related by patient, he was at the [facility] about 4 weeks ago and slipped on a wet floor and had to suddenly stop using his left leg. The following day, he experienced pain in the left knee, mostly on the side and the back.  Pain has abated, though there is some mild tenderness to palpation. There has not been edema or erythema. No systemic symptoms noted either. ADL currently not affected.      PMHx:  Past Medical History:   Diagnosis Date     Alopecia areata     recurrent     Hyperlipidemia LDL goal < 160        PSHx:  Past Surgical History:   Procedure Laterality Date     CIRCUMCISION  7/8/2014    Procedure: CIRCUMCISION;  Surgeon: Tomas Francisco MD;  Location: [facility]     COLONOSCOPY  2015    NORMAL, repeat in 2020     VASECTOMY         FamHx:  Family History   Problem Relation Age of Onset     Diabetes Mother      Cancer - colorectal Father 64     Diabetes Brother        Allergies:     Allergies   Allergen Reactions     Atorvastatin Muscle Pain (Myalgia)     Dog Fennel Allergy Skin Test Itching     Ragweeds Other (See Comments)     rhinitis       Meds:    Current Outpatient Medications:      allopurinol (ZYLOPRIM) 300 MG tablet, Take 1 tablet (300 mg) by mouth daily, Disp: 90 tablet, Rfl: 0     rosuvastatin (CRESTOR) 10 MG tablet, Take 1 tablet (10 mg) by mouth At Bedtime, Disp: 90 tablet, Rfl: 0    SocHx:  Social History     Socioeconomic History     Marital status:    Tobacco Use     Smoking status: Never     Smokeless tobacco: Never   Substance and Sexual Activity     Alcohol use: No     Drug use: No   Social History Narrative    Lives in [facility], with " "wife and children.   2007, two children (2008, 09).  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          From [place], immigrated in 1986.  Family here.  5 siblings       Problem, Medication and Allergy Lists were reviewed and are current.  Patient is an established patient of this clinic.         Review of Systems:     ROS  I have personally reviewed and updated the complete ROS on the day of the visit.           Physical Exam:   /85 (BP Location: Right arm, Patient Position: Sitting, Cuff Size: Adult Regular)   Pulse 74   Ht 1.676 m (5' 5.98\")   Wt 71.6 kg (157 lb 14.4 oz)   SpO2 99%   BMI 25.50 kg/m    Body mass index is 25.5 kg/m .  Vitals were reviewed        GENERAL APPEARANCE: healthy, alert and no distress     RESP: lungs clear to auscultation - no rales, rhonchi or wheezes     CV: regular rates and rhythm, normal S1 S2, no S3 or S4 and no murmur, click or rub     MS: symmetric knees. No edema or erythema. Mild tenderness to palpation in lateral popliteal fossa.     SKIN: no suspicious lesions or rashes     NEURO: Normal strength and tone, sensory exam grossly normal, mentation intact and speech normal     PSYCH: mentation appears normal. and affect normal/bright        Results:     Lab on 11/10/2022   Component Date Value Ref Range Status     OSMIN interpretation 11/10/2022 Negative  Negative Final      Negative:              <1:40  Borderline Positive:   1:40 - 1:80  Positive:              >1:80     Color Urine 11/10/2022 Straw  Colorless, Straw, Light Yellow, Yellow Final     Appearance Urine 11/10/2022 Clear  Clear Final     Glucose Urine 11/10/2022 Negative  Negative mg/dL Final     Bilirubin Urine 11/10/2022 Negative  Negative Final     Ketones Urine 11/10/2022 Negative  Negative mg/dL Final     Specific Gravity Urine 11/10/2022 1.010  1.003 - 1.035 Final     Blood Urine 11/10/2022 Negative  Negative Final     pH Urine 11/10/2022 6.0  5.0 - 7.0 Final     Protein Albumin Urine " 11/10/2022 Negative  Negative mg/dL Final     Urobilinogen Urine 11/10/2022 Normal  Normal, 2.0 mg/dL Final     Nitrite Urine 11/10/2022 Negative  Negative Final     Leukocyte Esterase Urine 11/10/2022 Negative  Negative Final     RBC Urine 11/10/2022 1  <=2 /HPF Final     WBC Urine 11/10/2022 <1  <=5 /HPF Final     Sodium 11/10/2022 140  136 - 145 mmol/L Final     Potassium 11/10/2022 4.4  3.4 - 5.3 mmol/L Final     Chloride 11/10/2022 101  98 - 107 mmol/L Final     Carbon Dioxide (CO2) 11/10/2022 26  22 - 29 mmol/L Final     Anion Gap 11/10/2022 13  7 - 15 mmol/L Final     Urea Nitrogen 11/10/2022 9.5  6.0 - 20.0 mg/dL Final     Creatinine 11/10/2022 1.04  0.67 - 1.17 mg/dL Final     Calcium 11/10/2022 10.0  8.6 - 10.0 mg/dL Final     Glucose 11/10/2022 92  70 - 99 mg/dL Final     GFR Estimate 11/10/2022 86  >60 mL/min/1.73m2 Final    Effective December 21, 2021 eGFRcr in adults is calculated using the 2021 CKD-EPI creatinine equation which includes age and gender (Cyn et al., NEJ, DOI: 10.1056/KNYSrg9998663)     Cholesterol 11/10/2022 293 (H)  <200 mg/dL Final     Triglycerides 11/10/2022 504 (H)  <150 mg/dL Final     Direct Measure HDL 11/10/2022 38 (L)  >=40 mg/dL Final     LDL Cholesterol Calculated 11/10/2022    Final    Cannot estimate LDL when triglyceride exceeds 400 mg/dL     Non HDL Cholesterol 11/10/2022 255 (H)  <130 mg/dL Final     CRP Inflammation 11/10/2022 <3.00  <5.00 mg/L Final     Erythrocyte Sedimentation Rate 11/10/2022 9  0 - 20 mm/hr Final     Prostate Specific Antigen Screen 11/10/2022 4.76 (H)  0.00 - 3.50 ng/mL Final     Hemoglobin A1C 11/10/2022 5.5  <5.7 % Final    Normal <5.7%   Prediabetes 5.7-6.4%    Diabetes 6.5% or higher     Note: Adopted from ADA consensus guidelines.     CK 11/10/2022 422 (H)  39 - 308 U/L Final     Protein Total 11/10/2022 7.7  6.4 - 8.3 g/dL Final     Albumin 11/10/2022 5.0  3.5 - 5.2 g/dL Final     Bilirubin Total 11/10/2022 0.3  <=1.2 mg/dL Final      "Alkaline Phosphatase 11/10/2022 73  40 - 129 U/L Final     AST 11/10/2022 36  10 - 50 U/L Final     ALT 11/10/2022 43  10 - 50 U/L Final     Bilirubin Direct 11/10/2022 <0.20  0.00 - 0.30 mg/dL Final       Lab Results   Component Value Date    WBC 5.6 07/07/2021    HGB 15.5 07/07/2021    HCT 46.3 07/07/2021     07/07/2021     11/10/2022    POTASSIUM 4.4 11/10/2022    CHLORIDE 101 11/10/2022    CO2 26 11/10/2022    BUN 9.5 11/10/2022    CR 1.04 11/10/2022    GLC 92 11/10/2022    SED 9 11/10/2022    AST 36 11/10/2022    ALT 43 11/10/2022    ALKPHOS 73 11/10/2022    BILITOTAL 0.3 11/10/2022       Assessment and Plan     Ricky Varela is a 52-year-old man with history of dysplipidemia, dog allergy, vasectomy, and gout, who presents to clinic for L knee pain.    Acute pain of left knee  No crepitus or \"catching\" on exam, lowering concern for meniscal tear. Joint stability and integrity on PE also reassuring exam ligamental tear.  -     XR Knee Left 3 Views; Future  -     Physical Therapy Referral; Future      Options for treatment and follow-up care were reviewed with the patient. Ricky Varela engaged in the decision making process and verbalized understanding of the options discussed and agreed with the final plan.      RTC PRN.    Himanshu Damian  Internal Medicine, PGY-3  Primary Care Clinic  [facility]  Pager: [phone]    Dec 20, 2022    Pt was seen and plan of care discussed with Dr Garcia.     "

## 2023-02-10 ENCOUNTER — OFFICE VISIT (OUTPATIENT)
Dept: INTERNAL MEDICINE | Facility: CLINIC | Age: 53
End: 2023-02-10
Payer: COMMERCIAL

## 2023-02-10 ENCOUNTER — LAB (OUTPATIENT)
Dept: LAB | Facility: CLINIC | Age: 53
End: 2023-02-10
Payer: COMMERCIAL

## 2023-02-10 VITALS
BODY MASS INDEX: 25.31 KG/M2 | HEART RATE: 62 BPM | OXYGEN SATURATION: 99 % | HEIGHT: 66 IN | SYSTOLIC BLOOD PRESSURE: 123 MMHG | WEIGHT: 157.5 LBS | DIASTOLIC BLOOD PRESSURE: 79 MMHG

## 2023-02-10 DIAGNOSIS — M10.071 ACUTE IDIOPATHIC GOUT OF RIGHT ANKLE: ICD-10-CM

## 2023-02-10 DIAGNOSIS — E78.5 HYPERLIPIDEMIA WITH TARGET LDL LESS THAN 160: ICD-10-CM

## 2023-02-10 DIAGNOSIS — R97.20 ELEVATED PROSTATE SPECIFIC ANTIGEN (PSA): ICD-10-CM

## 2023-02-10 DIAGNOSIS — Z00.00 PREVENTATIVE HEALTH CARE: Primary | ICD-10-CM

## 2023-02-10 DIAGNOSIS — R97.20 ELEVATED PROSTATE SPECIFIC ANTIGEN (PSA): Primary | ICD-10-CM

## 2023-02-10 LAB
ALBUMIN SERPL BCG-MCNC: 4.4 G/DL (ref 3.5–5.2)
ALP SERPL-CCNC: 57 U/L (ref 40–129)
ALT SERPL W P-5'-P-CCNC: 44 U/L (ref 10–50)
ANION GAP SERPL CALCULATED.3IONS-SCNC: 10 MMOL/L (ref 7–15)
AST SERPL W P-5'-P-CCNC: 32 U/L (ref 10–50)
BILIRUB SERPL-MCNC: 0.3 MG/DL
BUN SERPL-MCNC: 13.5 MG/DL (ref 6–20)
CALCIUM SERPL-MCNC: 9.2 MG/DL (ref 8.6–10)
CHLORIDE SERPL-SCNC: 108 MMOL/L (ref 98–107)
CHOLEST SERPL-MCNC: 160 MG/DL
CK SERPL-CCNC: 304 U/L (ref 39–308)
CREAT SERPL-MCNC: 1.05 MG/DL (ref 0.67–1.17)
DEPRECATED HCO3 PLAS-SCNC: 24 MMOL/L (ref 22–29)
GFR SERPL CREATININE-BSD FRML MDRD: 85 ML/MIN/1.73M2
GLUCOSE SERPL-MCNC: 103 MG/DL (ref 70–99)
HDLC SERPL-MCNC: 34 MG/DL
LDLC SERPL CALC-MCNC: 91 MG/DL
NONHDLC SERPL-MCNC: 126 MG/DL
POTASSIUM SERPL-SCNC: 4.5 MMOL/L (ref 3.4–5.3)
PROT SERPL-MCNC: 7 G/DL (ref 6.4–8.3)
PSA SERPL-MCNC: 3.26 NG/ML (ref 0–3.5)
SODIUM SERPL-SCNC: 142 MMOL/L (ref 136–145)
TRIGL SERPL-MCNC: 173 MG/DL

## 2023-02-10 PROCEDURE — 80053 COMPREHEN METABOLIC PANEL: CPT | Performed by: PATHOLOGY

## 2023-02-10 PROCEDURE — 99396 PREV VISIT EST AGE 40-64: CPT | Performed by: INTERNAL MEDICINE

## 2023-02-10 PROCEDURE — 36415 COLL VENOUS BLD VENIPUNCTURE: CPT | Performed by: PATHOLOGY

## 2023-02-10 PROCEDURE — 80061 LIPID PANEL: CPT | Performed by: PATHOLOGY

## 2023-02-10 PROCEDURE — 84153 ASSAY OF PSA TOTAL: CPT | Performed by: PATHOLOGY

## 2023-02-10 PROCEDURE — 82550 ASSAY OF CK (CPK): CPT | Performed by: PATHOLOGY

## 2023-02-10 RX ORDER — ROSUVASTATIN CALCIUM 10 MG/1
10 TABLET, COATED ORAL AT BEDTIME
Qty: 90 TABLET | Refills: 3 | Status: SHIPPED | OUTPATIENT
Start: 2023-02-10 | End: 2024-03-27

## 2023-02-10 RX ORDER — ALLOPURINOL 300 MG/1
1 TABLET ORAL DAILY
Qty: 90 TABLET | Refills: 3 | Status: SHIPPED | OUTPATIENT
Start: 2023-02-10 | End: 2024-05-09

## 2023-02-10 NOTE — NURSING NOTE
"Ricky Varela is a 52 year old male patient that presents today in clinic for the following:    Chief Complaint   Patient presents with     Follow Up     Pt here for follow up     The patient's allergies and medications were reviewed as noted. A set of vitals were recorded as noted without incident: /79 (BP Location: Right arm, Patient Position: Sitting, Cuff Size: Adult Regular)   Pulse 62   Ht 1.676 m (5' 5.98\")   Wt 71.4 kg (157 lb 8 oz)   SpO2 99%   BMI 25.43 kg/m  . The patient does not have any other questions for the provider.    Nancy Juares, EMT at 9:33 AM on 2/10/2023  "

## 2023-02-10 NOTE — PROGRESS NOTES
3  SUBJECTIVE:   CC: Ricky Varela is an 52 year old male who presents for preventive health visit.       Patient has been advised of split billing requirements and indicates understanding: Yes  Healthy Habits:    Do you get at least three servings of calcium containing foods daily (dairy, green leafy vegetables, etc.)? yes    Amount of exercise or daily activities, outside of work: 5 days a week, weights, walking, jogging    Problems taking medications regularly No    Medication side effects: No    Have you had an eye exam in the past two years? yes    Do you see a dentist twice per year? yes    Do you have sleep apnea, excessive snoring or daytime drowsiness?no      -------------------------------------    Today's PHQ-2 Score:   PHQ-2 ( 1999 [employer]) 2/10/2023 11/10/2022   Q1: Little interest or pleasure in doing things 0 0   Q2: Feeling down, depressed or hopeless 0 0   PHQ-2 Score 0 0   PHQ-2 Total Score (12-17 Years)- Positive if 3 or more points; Administer PHQ-A if positive - -       Abuse: Current or Past(Physical, Sexual or Emotional)- No  Do you feel safe in your environment? Yes    Have you ever done Advance Care Planning? (For example, a Health Directive, POLST, or a discussion with a medical provider or your loved ones about your wishes): No, advance care planning information given to patient to review.  Patient plans to discuss their wishes with loved ones or provider.      Social History     Tobacco Use     Smoking status: Never     Smokeless tobacco: Never   Substance Use Topics     Alcohol use: No     If you drink alcohol do you typically have >3 drinks per day or >7 drinks per week? No                      Last PSA:   PSA Tumor Marker   Date Value Ref Range Status   02/10/2023 3.26 0.00 - 3.50 ng/mL Final       Reviewed orders with patient. Reviewed health maintenance and updated orders accordingly - Yes  Labs reviewed in EPIC    Reviewed and updated as needed this visit by clinical staff     "Allergies  Meds              Reviewed and updated as needed this visit by Provider                 Past Medical History:   Diagnosis Date     Alopecia areata     recurrent     Hyperlipidemia LDL goal < 160       Past Surgical History:   Procedure Laterality Date     CIRCUMCISION  7/8/2014    Procedure: CIRCUMCISION;  Surgeon: Tomas Francisco MD;  [facility]     COLONOSCOPY  2015    NORMAL, repeat in 2020     VASECTOMY         ROS:  CONSTITUTIONAL: NEGATIVE for fever, chills, change in weight  INTEGUMENTARY/SKIN: NEGATIVE for worrisome rashes, moles or lesions  EYES: NEGATIVE for vision changes or irritation  ENT: NEGATIVE for ear, mouth and throat problems  RESP: NEGATIVE for significant cough or SOB  CV: NEGATIVE for chest pain, palpitations or peripheral edema  GI: NEGATIVE for nausea, abdominal pain, heartburn, or change in bowel habits   male: negative for dysuria, hematuria, decreased urinary stream, erectile dysfunction, urethral discharge  MUSCULOSKELETAL: NEGATIVE for significant arthralgias or myalgia  NEURO: NEGATIVE for weakness, dizziness or paresthesias  PSYCHIATRIC: NEGATIVE for changes in mood or affect    OBJECTIVE:   /79 (BP Location: Right arm, Patient Position: Sitting, Cuff Size: Adult Regular)   Pulse 62   Ht 1.676 m (5' 5.98\")   Wt 71.4 kg (157 lb 8 oz)   SpO2 99%   BMI 25.43 kg/m    EXAM:  GENERAL: healthy, alert and no distress  EYES: Eyes grossly normal to inspection, PERRL and conjunctivae and sclerae normal  RESP: normal effort, no wheezing  CV: regular rates and rhythm and no peripheral edema  MS: no gross musculoskeletal defects noted, no edema    Diagnostic Test Results:  Labs reviewed in Epic    ASSESSMENT/PLAN:       ICD-10-CM    1. Preventative health care  Z00.00       2. Acute idiopathic gout of right ankle  M10.071 allopurinol (ZYLOPRIM) 300 MG tablet      3. Hyperlipidemia with target LDL less than 160  E78.5 rosuvastatin (CRESTOR) 10 MG tablet    " "      Patient has been advised of split billing requirements and indicates understanding: Yes  COUNSELING:  Reviewed preventive health counseling, as reflected in patient instructions       Regular exercise       Healthy diet/nutrition    Estimated body mass index is 25.43 kg/m  as calculated from the following:    Height as of this encounter: 1.676 m (5' 5.98\").    Weight as of this encounter: 71.4 kg (157 lb 8 oz).        He reports that he has never smoked. He has never used smokeless tobacco.      Counseling Resources:  ATP IV Guidelines  Pooled Cohorts Equation Calculator  FRAX Risk Assessment  ICSI Preventive Guidelines  Dietary Guidelines for Americans, 2010  USDA's MyPlate  ASA Prophylaxis  Lung CA Screening    Cait Kaiser MD  [facility] INTERNAL MEDICINE [place]    "

## 2024-02-15 ENCOUNTER — DOCUMENTATION ONLY (OUTPATIENT)
Dept: TRANSPLANT | Facility: CLINIC | Age: 54
End: 2024-02-15
Payer: COMMERCIAL

## 2024-02-15 DIAGNOSIS — E78.5 HYPERLIPIDEMIA WITH TARGET LDL LESS THAN 160: Primary | ICD-10-CM

## 2024-02-15 NOTE — PROGRESS NOTES
Hello,   In order to offer our patients the best possible experience, the lab schedule is reviewed to ensure patients have lab orders in Epic prior to arriving at the lab.    Please have one of your team members place a lab order in Epic for this patient prior to the lab appointment date.     Thank you for your attention,   [facility] [phone]

## 2024-02-16 ENCOUNTER — HOSPITAL ENCOUNTER (EMERGENCY)
Facility: CLINIC | Age: 54
Discharge: HOME | End: 2024-02-16
Attending: EMERGENCY MEDICINE | Admitting: EMERGENCY MEDICINE
Payer: COMMERCIAL

## 2024-02-16 ENCOUNTER — LAB (OUTPATIENT)
Dept: LAB | Facility: CLINIC | Age: 54
End: 2024-02-16
Payer: COMMERCIAL

## 2024-02-16 ENCOUNTER — OFFICE VISIT (OUTPATIENT)
Dept: INTERNAL MEDICINE | Facility: CLINIC | Age: 54
End: 2024-02-16
Payer: COMMERCIAL

## 2024-02-16 VITALS
HEIGHT: 66 IN | DIASTOLIC BLOOD PRESSURE: 89 MMHG | HEART RATE: 81 BPM | BODY MASS INDEX: 25.63 KG/M2 | OXYGEN SATURATION: 96 % | SYSTOLIC BLOOD PRESSURE: 136 MMHG | WEIGHT: 159.5 LBS

## 2024-02-16 VITALS
RESPIRATION RATE: 16 BRPM | DIASTOLIC BLOOD PRESSURE: 88 MMHG | OXYGEN SATURATION: 98 % | TEMPERATURE: 98.2 F | HEART RATE: 82 BPM | SYSTOLIC BLOOD PRESSURE: 132 MMHG

## 2024-02-16 DIAGNOSIS — Z00.00 PREVENTATIVE HEALTH CARE: Primary | ICD-10-CM

## 2024-02-16 DIAGNOSIS — E87.5 HYPERKALEMIA: ICD-10-CM

## 2024-02-16 DIAGNOSIS — E78.1 HYPERTRIGLYCERIDEMIA: ICD-10-CM

## 2024-02-16 DIAGNOSIS — Z80.0 FAMILY HISTORY OF COLON CANCER: ICD-10-CM

## 2024-02-16 DIAGNOSIS — E78.5 HYPERLIPIDEMIA WITH TARGET LDL LESS THAN 160: ICD-10-CM

## 2024-02-16 DIAGNOSIS — Z01.89 ROUTINE LAB DRAW: ICD-10-CM

## 2024-02-16 LAB
ALBUMIN SERPL BCG-MCNC: 4.7 G/DL (ref 3.5–5.2)
ALP SERPL-CCNC: 65 U/L (ref 40–150)
ALT SERPL W P-5'-P-CCNC: 66 U/L (ref 0–70)
ANION GAP SERPL CALCULATED.3IONS-SCNC: 12 MMOL/L (ref 7–15)
ANION GAP SERPL CALCULATED.3IONS-SCNC: 13 MMOL/L (ref 7–15)
AST SERPL W P-5'-P-CCNC: 38 U/L (ref 0–45)
ATRIAL RATE - MUSE: 79 BPM
BILIRUB SERPL-MCNC: 0.3 MG/DL
BUN SERPL-MCNC: 13.1 MG/DL (ref 6–20)
BUN SERPL-MCNC: 14.5 MG/DL (ref 6–20)
CALCIUM SERPL-MCNC: 10.5 MG/DL (ref 8.6–10)
CALCIUM SERPL-MCNC: 9.7 MG/DL (ref 8.6–10)
CHLORIDE SERPL-SCNC: 102 MMOL/L (ref 98–107)
CHLORIDE SERPL-SCNC: 106 MMOL/L (ref 98–107)
CHOLEST SERPL-MCNC: 193 MG/DL
CREAT SERPL-MCNC: 1.14 MG/DL (ref 0.67–1.17)
CREAT SERPL-MCNC: 1.38 MG/DL (ref 0.67–1.17)
DEPRECATED HCO3 PLAS-SCNC: 24 MMOL/L (ref 22–29)
DEPRECATED HCO3 PLAS-SCNC: 26 MMOL/L (ref 22–29)
DIASTOLIC BLOOD PRESSURE - MUSE: NORMAL MMHG
EGFRCR SERPLBLD CKD-EPI 2021: 61 ML/MIN/1.73M2
EGFRCR SERPLBLD CKD-EPI 2021: 76 ML/MIN/1.73M2
ERYTHROCYTE [DISTWIDTH] IN BLOOD BY AUTOMATED COUNT: 13.3 % (ref 10–15)
FASTING STATUS PATIENT QL REPORTED: YES
GLUCOSE SERPL-MCNC: 121 MG/DL (ref 70–99)
GLUCOSE SERPL-MCNC: 99 MG/DL (ref 70–99)
HBA1C MFR BLD: 6 %
HCT VFR BLD AUTO: 48.7 % (ref 40–53)
HDLC SERPL-MCNC: 37 MG/DL
HGB BLD-MCNC: 16.1 G/DL (ref 13.3–17.7)
INTERPRETATION ECG - MUSE: NORMAL
LDLC SERPL CALC-MCNC: 91 MG/DL
MCH RBC QN AUTO: 27.2 PG (ref 26.5–33)
MCHC RBC AUTO-ENTMCNC: 33.1 G/DL (ref 31.5–36.5)
MCV RBC AUTO: 82 FL (ref 78–100)
NONHDLC SERPL-MCNC: 156 MG/DL
P AXIS - MUSE: 52 DEGREES
PLATELET # BLD AUTO: 239 10E3/UL (ref 150–450)
POTASSIUM SERPL-SCNC: 4.6 MMOL/L (ref 3.4–5.3)
POTASSIUM SERPL-SCNC: 6.7 MMOL/L (ref 3.4–5.3)
PR INTERVAL - MUSE: 160 MS
PROT SERPL-MCNC: 7.8 G/DL (ref 6.4–8.3)
QRS DURATION - MUSE: 88 MS
QT - MUSE: 366 MS
QTC - MUSE: 419 MS
R AXIS - MUSE: 40 DEGREES
RBC # BLD AUTO: 5.93 10E6/UL (ref 4.4–5.9)
SODIUM SERPL-SCNC: 139 MMOL/L (ref 135–145)
SODIUM SERPL-SCNC: 144 MMOL/L (ref 135–145)
SYSTOLIC BLOOD PRESSURE - MUSE: NORMAL MMHG
T AXIS - MUSE: 42 DEGREES
TRIGL SERPL-MCNC: 327 MG/DL
URATE SERPL-MCNC: 6.5 MG/DL (ref 3.4–7)
VENTRICULAR RATE- MUSE: 79 BPM
WBC # BLD AUTO: 5.9 10E3/UL (ref 4–11)

## 2024-02-16 PROCEDURE — 99284 EMERGENCY DEPT VISIT MOD MDM: CPT | Performed by: EMERGENCY MEDICINE

## 2024-02-16 PROCEDURE — 93005 ELECTROCARDIOGRAM TRACING: CPT | Performed by: EMERGENCY MEDICINE

## 2024-02-16 PROCEDURE — 85027 COMPLETE CBC AUTOMATED: CPT | Performed by: PATHOLOGY

## 2024-02-16 PROCEDURE — 91320 SARSCV2 VAC 30MCG TRS-SUC IM: CPT | Performed by: INTERNAL MEDICINE

## 2024-02-16 PROCEDURE — 93010 ELECTROCARDIOGRAM REPORT: CPT | Performed by: EMERGENCY MEDICINE

## 2024-02-16 PROCEDURE — 36415 COLL VENOUS BLD VENIPUNCTURE: CPT | Performed by: EMERGENCY MEDICINE

## 2024-02-16 PROCEDURE — 90480 ADMN SARSCOV2 VAC 1/ONLY CMP: CPT | Performed by: INTERNAL MEDICINE

## 2024-02-16 PROCEDURE — 90471 IMMUNIZATION ADMIN: CPT | Performed by: INTERNAL MEDICINE

## 2024-02-16 PROCEDURE — 80061 LIPID PANEL: CPT | Performed by: PATHOLOGY

## 2024-02-16 PROCEDURE — 82310 ASSAY OF CALCIUM: CPT | Performed by: EMERGENCY MEDICINE

## 2024-02-16 PROCEDURE — 84550 ASSAY OF BLOOD/URIC ACID: CPT | Performed by: PATHOLOGY

## 2024-02-16 PROCEDURE — 90686 IIV4 VACC NO PRSV 0.5 ML IM: CPT | Performed by: INTERNAL MEDICINE

## 2024-02-16 PROCEDURE — 99000 SPECIMEN HANDLING OFFICE-LAB: CPT | Performed by: PATHOLOGY

## 2024-02-16 PROCEDURE — 99396 PREV VISIT EST AGE 40-64: CPT | Mod: 25 | Performed by: INTERNAL MEDICINE

## 2024-02-16 PROCEDURE — 83036 HEMOGLOBIN GLYCOSYLATED A1C: CPT | Performed by: INTERNAL MEDICINE

## 2024-02-16 PROCEDURE — 36415 COLL VENOUS BLD VENIPUNCTURE: CPT | Performed by: PATHOLOGY

## 2024-02-16 PROCEDURE — 99283 EMERGENCY DEPT VISIT LOW MDM: CPT | Mod: 25 | Performed by: EMERGENCY MEDICINE

## 2024-02-16 PROCEDURE — 80053 COMPREHEN METABOLIC PANEL: CPT | Performed by: PATHOLOGY

## 2024-02-16 ASSESSMENT — ACTIVITIES OF DAILY LIVING (ADL)
ADLS_ACUITY_SCORE: 35
ADLS_ACUITY_SCORE: 33
ADLS_ACUITY_SCORE: 35

## 2024-02-16 NOTE — PROGRESS NOTES
Triple Blood Pressure    A triple blood pressure AHA was preformed wherein the blood pressure machine took Ricky Raghavendra Liu's blood pressure over the course of ten minutes. The following were the individual blood pressures that were observed at today's visit:    (1) 134/82     (2) 126/83    (3) 134/84    The average blood pressure from today's individual readings were 131/83. The results of today's visit were communicated to the ordering provider.    Gisele Finnegan EMT at 8:39 AM on 2/16/2024

## 2024-02-16 NOTE — DISCHARGE INSTRUCTIONS
Your potassium here was normal. The prior may have been a hemolyzed sample. Follow up with your PCP for routine health maintenance as usual.

## 2024-02-16 NOTE — ED TRIAGE NOTES
Pt arrives ambulatory to triage for abnormal labs. K+ is 6.7     Triage Assessment (Adult)       Row Name 02/16/24 1029          Triage Assessment    Airway WDL WDL        Respiratory WDL    Respiratory WDL WDL        Cardiac WDL    Cardiac WDL WDL        Peripheral/Neurovascular WDL    Peripheral Neurovascular WDL WDL        Cognitive/Neuro/Behavioral WDL    Cognitive/Neuro/Behavioral WDL WDL

## 2024-02-16 NOTE — PROGRESS NOTES
Ricky is a 53 year old that presents in clinic today for the following:     Chief Complaint   Patient presents with    Physical     3 episodes of lightheadedness and excess salvia secretion for about a minute or two, one time patient was asleep and other two times patient was awake           2/16/2024     8:22 AM   Additional Questions   Roomed by [facility] EMT     Screenings as of today     PHQ-2 Total Score (Adult) - Positive if 3 or more points; Administer   PHQ-9 if positive 0        Gisele Finnegan MA at 8:38 AM on 2/16/2024

## 2024-02-16 NOTE — ED PROVIDER NOTES
[place] EMERGENCY DEPARTMENT ([facility])    2/16/24       ED PROVIDER NOTE       History     Chief Complaint   Patient presents with    Abnormal Labs     The history is provided by the patient and medical records.     Ricky Varela is a 53 year old male who presents to the emergency department for evaluation of elevated potassium (6.7) at his PCP this morning during routine physical annual exam. Labs were drawn routinely prior to evaluation. Patient has not had any changes in urination, has no known kidney disease, no new medications, no history of hyperkalemia.  He denied headache, vision changes, nausea, vomiting, or abdominal pain. No fevers, chills or other complaints. No flank pain, dysuria, changes in urinary frequency.     Past Medical History  Past Medical History:   Diagnosis Date    Alopecia areata     recurrent    Hyperlipidemia LDL goal < 160      Past Surgical History:   Procedure Laterality Date    CIRCUMCISION  7/8/2014    Procedure: CIRCUMCISION;  Surgeon: Tomas Francisco MD;  [facility]    COLONOSCOPY  2015    NORMAL, repeat in 2020    VASECTOMY       allopurinol (ZYLOPRIM) 300 MG tablet  rosuvastatin (CRESTOR) 10 MG tablet      Allergies   Allergen Reactions    Atorvastatin Muscle Pain (Myalgia)    Dog Fennel Allergy Skin Test Itching    Ragweeds Other (See Comments)     rhinitis     Family History  Family History   Problem Relation Age of Onset    Diabetes Mother     Cancer - colorectal Father 64    Diabetes Brother      Social History   Social History     Tobacco Use    Smoking status: Never    Smokeless tobacco: Never   Substance Use Topics    Alcohol use: No    Drug use: No         A complete review of systems was performed with pertinent positives and negatives noted in the HPI, and all other systems negative.    Physical Exam   BP: 132/88  Pulse: 82  Temp: 98.2  F (36.8  C)  Resp: 16  SpO2: 98 %  Physical Exam  Vitals and nursing note reviewed.       Physical Exam    Constitutional: Pt is oriented to person, place, and time.Pt appears well-developed and well-nourished.   HENT:   Head: Normocephalic and atraumatic.   Eyes: Conjunctivae are normal. Pupils are equal, round, and reactive to light.   Neck: Normal range of motion. Neck supple.   Cardiovascular: Normal rate, regular rhythm, normal heart sounds and intact distal pulses.    Pulmonary/Chest: Effort normal and breath sounds normal. No respiratory distress.  Abdominal: Soft. No tenderness.    Musculoskeletal: Normal range of motion. Pt exhibits no edema.   Neurological: Pt is alert and oriented to person, place, and time. Normal reflexes.   Skin: Skin is warm and dry. No rash noted.   Psychiatric: Pt has a normal mood and affect. Behavior is normal. Judgment and thought content normal.       ED Course, Procedures, & Data      Procedures            EKG Interpretation:      Interpreted by Asa Sharma MD  Time reviewed: 10:35am  Symptoms at time of EKG: hyperkalemia (no symptoms)   Rhythm: normal sinus   Rate: normal  Axis: normal  Ectopy: none  Conduction: normal  ST Segments/ T Waves: No ST-T wave changes  Q Waves: none  Comparison to prior: No old EKG available    Clinical Impression: normal EKG                 Results for orders placed or performed during the hospital encounter of 02/16/24   Basic metabolic panel     Status: Normal   Result Value Ref Range    Sodium 139 135 - 145 mmol/L    Potassium 4.6 3.4 - 5.3 mmol/L    Chloride 102 98 - 107 mmol/L    Carbon Dioxide (CO2) 24 22 - 29 mmol/L    Anion Gap 13 7 - 15 mmol/L    Urea Nitrogen 13.1 6.0 - 20.0 mg/dL    Creatinine 1.14 0.67 - 1.17 mg/dL    GFR Estimate 76 >60 mL/min/1.73m2    Calcium 9.7 8.6 - 10.0 mg/dL    Glucose 99 70 - 99 mg/dL   EKG 12 lead     Status: None (Preliminary result)   Result Value Ref Range    Systolic Blood Pressure  mmHg    Diastolic Blood Pressure  mmHg    Ventricular Rate 79 BPM    Atrial Rate 79 BPM    ID Interval 160 ms    QRS  Duration 88 ms     ms    QTc 419 ms    P Axis 52 degrees    R AXIS 40 degrees    T Axis 42 degrees    Interpretation ECG       Sinus rhythm  Possible Left atrial enlargement  Borderline ECG     Results for orders placed or performed in visit on 02/16/24   Lipid Profile     Status: Abnormal   Result Value Ref Range    Cholesterol 193 <200 mg/dL    Triglycerides 327 (H) <150 mg/dL    Direct Measure HDL 37 (L) >=40 mg/dL    LDL Cholesterol Calculated 91 <=100 mg/dL    Non HDL Cholesterol 156 (H) <130 mg/dL    Patient Fasting > 8hrs? Yes     Narrative    Cholesterol  Desirable:  <200 mg/dL    Triglycerides  Normal:  Less than 150 mg/dL  Borderline High:  150-199 mg/dL  High:  200-499 mg/dL  Very High:  Greater than or equal to 500 mg/dL    Direct Measure HDL  Female:  Greater than or equal to 50 mg/dL   Male:  Greater than or equal to 40 mg/dL    LDL Cholesterol  Desirable:  <100mg/dL  Above Desirable:  100-129 mg/dL   Borderline High:  130-159 mg/dL   High:  160-189 mg/dL   Very High:  >= 190 mg/dL    Non HDL Cholesterol  Desirable:  130 mg/dL  Above Desirable:  130-159 mg/dL  Borderline High:  160-189 mg/dL  High:  190-219 mg/dL  Very High:  Greater than or equal to 220 mg/dL   Comprehensive metabolic panel     Status: Abnormal   Result Value Ref Range    Sodium 144 135 - 145 mmol/L    Potassium 6.7 (HH) 3.4 - 5.3 mmol/L    Carbon Dioxide (CO2) 26 22 - 29 mmol/L    Anion Gap 12 7 - 15 mmol/L    Urea Nitrogen 14.5 6.0 - 20.0 mg/dL    Creatinine 1.38 (H) 0.67 - 1.17 mg/dL    GFR Estimate 61 >60 mL/min/1.73m2    Calcium 10.5 (H) 8.6 - 10.0 mg/dL    Chloride 106 98 - 107 mmol/L    Glucose 121 (H) 70 - 99 mg/dL    Alkaline Phosphatase 65 40 - 150 U/L    AST 38 0 - 45 U/L    ALT 66 0 - 70 U/L    Protein Total 7.8 6.4 - 8.3 g/dL    Albumin 4.7 3.5 - 5.2 g/dL    Bilirubin Total 0.3 <=1.2 mg/dL   CBC with platelets     Status: Abnormal   Result Value Ref Range    WBC Count 5.9 4.0 - 11.0 10e3/uL    RBC Count 5.93 (H)  4.40 - 5.90 10e6/uL    Hemoglobin 16.1 13.3 - 17.7 g/dL    Hematocrit 48.7 40.0 - 53.0 %    MCV 82 78 - 100 fL    MCH 27.2 26.5 - 33.0 pg    MCHC 33.1 31.5 - 36.5 g/dL    RDW 13.3 10.0 - 15.0 %    Platelet Count 239 150 - 450 10e3/uL   Uric acid     Status: Normal   Result Value Ref Range    Uric Acid 6.5 3.4 - 7.0 mg/dL   Hemoglobin A1c     Status: Abnormal   Result Value Ref Range    Hemoglobin A1C 6.0 (H) <5.7 %     Medications - No data to display  Labs Ordered and Resulted from Time of ED Arrival to Time of ED Departure   BASIC METABOLIC PANEL - Normal       Result Value    Sodium 139      Potassium 4.6      Chloride 102      Carbon Dioxide (CO2) 24      Anion Gap 13      Urea Nitrogen 13.1      Creatinine 1.14      GFR Estimate 76      Calcium 9.7      Glucose 99       No orders to display          Critical care was not performed.     Medical Decision Making  The patient's presentation was of low complexity (an acute and uncomplicated illness or injury).    The patient's evaluation involved:  review of external note(s) from 1 sources (see separate area of note for details)  review of 1 test result(s) ordered prior to this encounter (see separate area of note for details)  ordering and/or review of 1 test(s) in this encounter (see separate area of note for details)    The patient's management necessitated only low risk treatment.    Assessment & Plan    Ricky Varela is a 53 year old male who presents to the emergency department for evaluation of elevated potassium (6.7) at his PCP this morning.  Patient has reported 3 episodes of dizziness associated with excessive salivation.  He denied headache, vision changes, nausea, vomiting, or abdominal pain during these episodes.    EKG obtained and showed normal sinus rhythm, no signs of hyperkalemia. Patient's BMP redrawn and normal, with potassium 4.6. The prior draw may have been hemolyzed. There is no clear reason he would have acute new hyperkalemia and labwork  here is reassuring. Patient discharged with outpatient follow up and return precautions.     I have reviewed the nursing notes. I have reviewed the findings, diagnosis, plan and need for follow up with the patient.    New Prescriptions    No medications on file       Final diagnoses:   Routine lab draw   Hyperkalemia       Asa Sharma I, Asher Peña, am serving as a trained medical scribe to document services personally performed by Asa Sharma MD based on the provider's statements to me on February 16, 2024.  This document has been checked and approved by the attending provider.    I, Asa Sharma MD, was physically present and have reviewed and verified the accuracy of this note documented by Asher Peña medical scribe.      Asa Sharma MD   [facility] EMERGENCY DEPARTMENT  2/16/2024     Asa Sharma MD  02/16/24 [id number]

## 2024-02-16 NOTE — PROGRESS NOTES
Patient was called with critical potassium value and advised to come to emergency room as soon as possible.  He was advised that elevated potassium increases her risk for arrhythmia and urgent evaluation is needed.  He is in agreement with the plan and on the way to the ER.  Cait Kaiser MD

## 2024-03-26 DIAGNOSIS — E78.5 HYPERLIPIDEMIA WITH TARGET LDL LESS THAN 160: ICD-10-CM

## 2024-03-27 RX ORDER — ROSUVASTATIN CALCIUM 10 MG/1
10 TABLET, COATED ORAL
Qty: 90 TABLET | Refills: 3 | Status: SHIPPED | OUTPATIENT
Start: 2024-03-27

## 2024-03-27 NOTE — TELEPHONE ENCOUNTER
LVD:  Cait Adame MD  Internal Medicine Preventative health care     LDL Cholesterol Calculated   Date Value Ref Range Status   02/16/2024 91 <=100 mg/dL Final   07/07/2021 156 (H) <100 mg/dL Final     Comment:     Above desirable:  100-129 mg/dl  Borderline High:  130-159 mg/dL  High:             160-189 mg/dL  Very high:       >189 mg/dl         Refilled per protocol.

## 2024-03-28 ENCOUNTER — TELEPHONE (OUTPATIENT)
Dept: GASTROENTEROLOGY | Facility: CLINIC | Age: 54
End: 2024-03-28
Payer: COMMERCIAL

## 2024-03-28 NOTE — TELEPHONE ENCOUNTER
"Endoscopy Scheduling Screen    Have you had a positive Covid test in the last 14 days?  No    What is your communication preference for Instructions and/or Bowel Prep?   MyChart    What insurance is in the chart?  Other:  [employer]    Ordering/Referring Provider: ROSE MARY MALIK   (If ordering provider performs procedure, schedule with ordering provider unless otherwise instructed. )    BMI: Estimated body mass index is 25.76 kg/m  as calculated from the following:    Height as of 2/16/24: 1.676 m (5' 5.98\").    Weight as of 2/16/24: 72.3 kg (159 lb 8 oz).     Sedation Ordered  moderate sedation.   If patient BMI > 50 do not schedule in [facility].    If patient BMI > 45 do not schedule at [facility].    Are you taking methadone or Suboxone?  No    Have you had difficulties, pain, or discomfort during past endoscopy procedures?  No    Are you taking any prescription medications for pain 3 or more times per week?   NO, No RN review required.    Do you have a history of malignant hyperthermia?  No    (Females) Are you currently pregnant?   No     Have you been diagnosed or told you have pulmonary hypertension?   No    Do you have an LVAD?  No    Have you been told you have moderate to severe sleep apnea?  No    Have you been told you have COPD, asthma, or any other lung disease?  No    Do you have any heart conditions?  No     Have you ever had or are you waiting for an organ transplant?  No. Continue scheduling, no site restrictions.    Have you had a stroke or transient ischemic attack (TIA aka \"mini stroke\" in the last 6 months?   No    Have you been diagnosed with or been told you have cirrhosis of the liver?   No    Are you currently on dialysis?   No    Do you need assistance transferring?   No    BMI: Estimated body mass index is 25.76 kg/m  as calculated from the following:    Height as of 2/16/24: 1.676 m (5' 5.98\").    Weight as of 2/16/24: 72.3 kg (159 lb 8 oz).     Is patients BMI > 40 and scheduling location [facility]?  No    Do " you take an injectable medication for weight loss or diabetes (excluding insulin)?  No    Do you take the medication Naltrexone?  No    Do you take blood thinners?  No       Prep   Are you currently on dialysis or do you have chronic kidney disease?  No    Do you have a diagnosis of diabetes?  No    Do you have a diagnosis of cystic fibrosis (CF)?  No    On a regular basis do you go 3 -5 days between bowel movements?  No    BMI > 40?  No    Preferred Pharmacy:    [facility] [address]  [address]  [facility] [address]  Phone: [phone] Fax: [phone] Alternate Fax: [phone], [phone]      Final Scheduling Details     Procedure scheduled  Colonoscopy    Surgeon:  MARK     Date of procedure:  6/19/24     Pre-OP / PAC:   No - Not required for this site.    Location  [facility] - Per order.    Sedation   Moderate Sedation - Per order.      Patient Reminders:   You will receive a call from a Nurse to review instructions and health history.  This assessment must be completed prior to your procedure.  Failure to complete the Nurse assessment may result in the procedure being cancelled.      On the day of your procedure, please designate an adult(s) who can drive you home stay with you for the next 24 hours. The medicines used in the exam will make you sleepy. You will not be able to drive.      You cannot take public transportation, ride share services, or non-medical taxi service without a responsible caregiver.  Medical transport services are allowed with the requirement that a responsible caregiver will receive you at your destination.  We require that drivers and caregivers are confirmed prior to your procedure.

## 2024-04-29 DIAGNOSIS — M10.071 ACUTE IDIOPATHIC GOUT OF RIGHT ANKLE: ICD-10-CM

## 2024-05-09 RX ORDER — ALLOPURINOL 300 MG/1
TABLET ORAL
Qty: 90 TABLET | Refills: 2 | Status: SHIPPED | OUTPATIENT
Start: 2024-05-09

## 2024-05-09 NOTE — TELEPHONE ENCOUNTER
allopurinol (ZYLOPRIM) 300 MG tablet   90 tablet 3 2/10/2023     Last Office Visit : 2-  Future Office visit:  none    Gout Agents Protocol [id number]/07/2024 06:28 PM   Protocol Details Has Uric Acid on file in past 12 months and value is less than 6     Lab Test 02/16/24  0816   URIC 6.5

## 2024-05-16 DIAGNOSIS — M10.071 ACUTE IDIOPATHIC GOUT OF RIGHT ANKLE: ICD-10-CM

## 2024-05-22 ENCOUNTER — MYC REFILL (OUTPATIENT)
Dept: INTERNAL MEDICINE | Facility: CLINIC | Age: 54
End: 2024-05-22
Payer: COMMERCIAL

## 2024-05-22 DIAGNOSIS — M10.071 ACUTE IDIOPATHIC GOUT OF RIGHT ANKLE: ICD-10-CM

## 2024-05-23 RX ORDER — ALLOPURINOL 300 MG/1
TABLET ORAL
Qty: 90 TABLET | Refills: 2 | OUTPATIENT
Start: 2024-05-23

## 2024-05-24 RX ORDER — ALLOPURINOL 300 MG/1
TABLET ORAL
Qty: 90 TABLET | Refills: 3 | OUTPATIENT
Start: 2024-05-24

## 2024-06-11 ENCOUNTER — TELEPHONE (OUTPATIENT)
Dept: GASTROENTEROLOGY | Facility: CLINIC | Age: 54
End: 2024-06-11
Payer: COMMERCIAL

## 2024-06-11 NOTE — TELEPHONE ENCOUNTER
Pre visit planning completed.      Procedure details:    Patient scheduled for Colonoscopy  on 6/19/24.     Arrival time: 1245. Procedure time 1330    Facility location: [facility]; [address] [place] [address] [place] [address]. Check in location: 1st Floor [facility].     Sedation type: Conscious sedation     Pre op exam needed? N/A    Indication for procedure: Screening, Family hx colon cancer      Chart review:     Electronic implanted devices? No    Recent diagnosis of diverticulitis within the last 6 weeks? No    Diabetic? No      Medication review:    Anticoagulants? No    NSAIDS? No NSAID medications per patient's medication list.  RN will verify with pre-assessment call.    Other medication HOLDING recommendations:  N/A      Prep for procedure:     Bowel prep recommendation: Standard Miralax  Due to: standard bowel prep.    Prep instructions sent via [employer]ose martin Cortez RN  Endoscopy Procedure Pre Assessment RN  [phone] option 4

## 2024-06-11 NOTE — TELEPHONE ENCOUNTER
Pre assessment completed for upcoming procedure.   (Please see previous telephone encounter notes for complete details)      Procedure details:    Arrival time and facility location reviewed.    Pre op exam needed? N/A    Designated  policy reviewed. Instructed to have someone stay 6 hours post procedure.       Medication review:    Medications reviewed. Please see supporting documentation below. Holding recommendations discussed (if applicable).       Prep for procedure:     Patient stated they have already reviewed the bowel prep instructions and writer answered all patient questions (if applicable). NPO instructions reviewed.    Patient stated they couldn't find magnesium citrate. Advised patient of the option for Miralax prep without magnesium citrate. Patient declined stating they will just check another pharmacy. Advised patient to call back for alternative instructions if still not able to find magnesium citrate.    Patient was instructed to call if any questions or concerns arise.        Any additional information needed:    Patient asked if their insurance will cover procedure or if writer could check. Advised patient to contact with their insurance company to confirm coverage of procedure as writer is not able to verify that information. Writer offered to provide patient with the cost of care phone number - patient declined.     Patient verbalized understanding and had no questions or concerns at this time.      Lauren Cortez RN  Endoscopy Procedure Pre Assessment   [phone] option 4

## 2024-06-11 NOTE — TELEPHONE ENCOUNTER
Attempted to contact patient in order to complete pre assessment questions.     No answer. Left message to return call to [phone] option 4    Callback communication sent via [employer].    Perlita Tapia RN

## 2024-06-19 ENCOUNTER — HOSPITAL ENCOUNTER (OUTPATIENT)
Facility: CLINIC | Age: 54
Discharge: HOME | End: 2024-06-19
Attending: COLON & RECTAL SURGERY | Admitting: COLON & RECTAL SURGERY
Payer: COMMERCIAL

## 2024-06-19 VITALS
BODY MASS INDEX: 24.43 KG/M2 | HEIGHT: 66 IN | HEART RATE: 66 BPM | DIASTOLIC BLOOD PRESSURE: 93 MMHG | RESPIRATION RATE: 10 BRPM | OXYGEN SATURATION: 98 % | SYSTOLIC BLOOD PRESSURE: 118 MMHG | WEIGHT: 152 LBS

## 2024-06-19 LAB — COLONOSCOPY: NORMAL

## 2024-06-19 PROCEDURE — 45385 COLONOSCOPY W/LESION REMOVAL: CPT | Mod: PT | Performed by: COLON & RECTAL SURGERY

## 2024-06-19 PROCEDURE — 250N000011 HC RX IP 250 OP 636: Performed by: COLON & RECTAL SURGERY

## 2024-06-19 PROCEDURE — 88305 TISSUE EXAM BY PATHOLOGIST: CPT | Mod: 26

## 2024-06-19 PROCEDURE — 88305 TISSUE EXAM BY PATHOLOGIST: CPT | Mod: TC | Performed by: COLON & RECTAL SURGERY

## 2024-06-19 PROCEDURE — G0500 MOD SEDAT ENDO SERVICE >5YRS: HCPCS | Mod: PT | Performed by: COLON & RECTAL SURGERY

## 2024-06-19 RX ORDER — FENTANYL CITRATE 50 UG/ML
INJECTION, SOLUTION INTRAMUSCULAR; INTRAVENOUS PRN
Status: DISCONTINUED | OUTPATIENT
Start: 2024-06-19 | End: 2024-06-19 | Stop reason: HOSPADM

## 2024-06-19 RX ORDER — LIDOCAINE 40 MG/G
CREAM TOPICAL
Status: DISCONTINUED | OUTPATIENT
Start: 2024-06-19 | End: 2024-06-19 | Stop reason: HOSPADM

## 2024-06-19 RX ORDER — ONDANSETRON 2 MG/ML
4 INJECTION INTRAMUSCULAR; INTRAVENOUS
Status: DISCONTINUED | OUTPATIENT
Start: 2024-06-19 | End: 2024-06-19 | Stop reason: HOSPADM

## 2024-06-19 ASSESSMENT — ACTIVITIES OF DAILY LIVING (ADL)
ADLS_ACUITY_SCORE: 33
ADLS_ACUITY_SCORE: 35

## 2024-06-19 NOTE — H&P
Colon & Rectal Surgery History and Physical  Pre-Endoscopy Procedure Note    History of Present Illness   I have been asked by Dr. Kaiser to evaluate this 53 year old male for colorectal cancer screening. He currently denies any abdominal pain, weight loss, bleeding per rectum, or recent change in bowel habits.    Past Medical History  Diagnosis Date    Alopecia areata     recurrent    Gout     Hyperlipidemia LDL        Past Surgical History  Procedure Laterality Date    CIRCUMCISION  7/8/2014    Procedure: CIRCUMCISION;  Surgeon: Tomas Francisco MD;  Location: [facility] OR    VASECTOMY          Medications  Medications Prior to Admission   Medication Sig    allopurinol (ZYLOPRIM) 300 MG tablet : Take 1 tablet (300 mg) by mouth daily - Oral    rosuvastatin (CRESTOR) 10 MG tablet TAKE ONE TABLET BY MOUTH AT BEDTIME       Allergies  Allergen Reactions    Atorvastatin Muscle Pain (Myalgia)    Dog Fennel Allergy Skin Test Itching    Ragweeds Other (See Comments)     rhinitis        Family History   Family history includes Cancer - colorectal (age of onset: 64) in his father; Diabetes in his brother and mother.     Social History   He reports that he has never smoked. He has never used smokeless tobacco. He reports current alcohol use. He reports that he does not use drugs.    Review of Systems   Constitutional:  No fever, weight change or fatigue.    Eyes:     No dry eyes or vision changes.   Ears/Nose/Throat/Neck:  No oral ulcers, sore throat or voice change.    Cardiovascular:   No palpitations, syncope, angina or edema.   Respiratory:    No chest pain, excessive sleepiness, shortness of breath or hemoptysis.    Gastrointestinal:   No abdominal pain, nausea, vomiting, diarrhea or heartburn.    Genitourinary:   No dysuria, hematuria, urinary retention or urinary frequency.   Musculoskeletal:  No joint swelling or arthralgias.    Dermatologic:  No skin rash or other skin changes.   Neurologic:    No focal weakness or  "numbness. No neuropathy.   Psychiatric:    No depression, anxiety, suicidal ideation, or paranoid ideation.   Endocrine:   No cold or heat intolerance, polydipsia, hirsutism, change in libido, or flushing.   Hematology/Lymphatic:  No bleeding or lymphadenopathy.    Allergy/Immunology:  No rhinitis or hives.     Physical Exam   Vitals:  Blood pressure 124/88, pulse 72, resp. rate 16, height 1.676 m (5' 6\"), weight 68.9 kg (152 lb), SpO2 99%.    General:  Alert and oriented to person, place and time   Airway: Normal oropharyngeal airway and neck mobility   Lungs:  Clear bilaterally   Heart:  Regular rate and rhythm   Abdomen: Soft, NT, ND, no masses   Extremities: Warm, good capillary refill    ASA Grade: II (mild systemic disease)    Impression: Cleared for use of conscious sedation for colorectal cancer screening    Plan: Proceed with colonoscopy     Shamika Bateman MD  [facility]  [phone]  "

## 2024-06-21 LAB
PATH REPORT.COMMENTS IMP SPEC: NORMAL
PATH REPORT.FINAL DX SPEC: NORMAL
PATH REPORT.GROSS SPEC: NORMAL
PATH REPORT.MICROSCOPIC SPEC OTHER STN: NORMAL
PATH REPORT.RELEVANT HX SPEC: NORMAL
PHOTO IMAGE: NORMAL

## 2025-03-23 ENCOUNTER — HEALTH MAINTENANCE LETTER (OUTPATIENT)
Age: 55
End: 2025-03-23

## 2025-05-02 SDOH — HEALTH STABILITY: PHYSICAL HEALTH: ON AVERAGE, HOW MANY MINUTES DO YOU ENGAGE IN EXERCISE AT THIS LEVEL?: 30 MIN

## 2025-05-02 SDOH — HEALTH STABILITY: PHYSICAL HEALTH: ON AVERAGE, HOW MANY DAYS PER WEEK DO YOU ENGAGE IN MODERATE TO STRENUOUS EXERCISE (LIKE A BRISK WALK)?: 4 DAYS

## 2025-05-02 ASSESSMENT — SOCIAL DETERMINANTS OF HEALTH (SDOH): HOW OFTEN DO YOU GET TOGETHER WITH FRIENDS OR RELATIVES?: ONCE A WEEK

## 2025-05-05 ENCOUNTER — OFFICE VISIT (OUTPATIENT)
Dept: INTERNAL MEDICINE | Facility: CLINIC | Age: 55
End: 2025-05-05
Payer: COMMERCIAL

## 2025-05-05 ENCOUNTER — LAB (OUTPATIENT)
Dept: LAB | Facility: CLINIC | Age: 55
End: 2025-05-05
Payer: COMMERCIAL

## 2025-05-05 VITALS
SYSTOLIC BLOOD PRESSURE: 127 MMHG | DIASTOLIC BLOOD PRESSURE: 89 MMHG | HEIGHT: 66 IN | TEMPERATURE: 97.7 F | WEIGHT: 158.4 LBS | HEART RATE: 68 BPM | OXYGEN SATURATION: 97 % | RESPIRATION RATE: 14 BRPM | BODY MASS INDEX: 25.46 KG/M2

## 2025-05-05 DIAGNOSIS — E78.5 HYPERLIPIDEMIA WITH TARGET LDL LESS THAN 160: ICD-10-CM

## 2025-05-05 DIAGNOSIS — M10.071 ACUTE IDIOPATHIC GOUT OF RIGHT ANKLE: ICD-10-CM

## 2025-05-05 DIAGNOSIS — Z00.00 PREVENTATIVE HEALTH CARE: Primary | ICD-10-CM

## 2025-05-05 DIAGNOSIS — Z00.00 PREVENTATIVE HEALTH CARE: ICD-10-CM

## 2025-05-05 LAB
ANION GAP SERPL CALCULATED.3IONS-SCNC: 7 MMOL/L (ref 7–15)
BUN SERPL-MCNC: 15 MG/DL (ref 6–20)
CALCIUM SERPL-MCNC: 9.7 MG/DL (ref 8.8–10.4)
CHLORIDE SERPL-SCNC: 105 MMOL/L (ref 98–107)
CHOLEST SERPL-MCNC: 227 MG/DL
CREAT SERPL-MCNC: 1.22 MG/DL (ref 0.67–1.17)
EGFRCR SERPLBLD CKD-EPI 2021: 70 ML/MIN/1.73M2
EST. AVERAGE GLUCOSE BLD GHB EST-MCNC: 117 MG/DL
FASTING STATUS PATIENT QL REPORTED: YES
FASTING STATUS PATIENT QL REPORTED: YES
GLUCOSE SERPL-MCNC: 102 MG/DL (ref 70–99)
HBA1C MFR BLD: 5.7 %
HCO3 SERPL-SCNC: 27 MMOL/L (ref 22–29)
HDLC SERPL-MCNC: 32 MG/DL
LDLC SERPL CALC-MCNC: ABNORMAL MG/DL
LDLC SERPL DIRECT ASSAY-MCNC: 130 MG/DL
NONHDLC SERPL-MCNC: 195 MG/DL
POTASSIUM SERPL-SCNC: 4.7 MMOL/L (ref 3.4–5.3)
SODIUM SERPL-SCNC: 139 MMOL/L (ref 135–145)
TRIGL SERPL-MCNC: 406 MG/DL
URATE SERPL-MCNC: 10.5 MG/DL (ref 3.4–7)

## 2025-05-05 PROCEDURE — 3079F DIAST BP 80-89 MM HG: CPT | Performed by: INTERNAL MEDICINE

## 2025-05-05 PROCEDURE — 3074F SYST BP LT 130 MM HG: CPT | Performed by: INTERNAL MEDICINE

## 2025-05-05 PROCEDURE — 99000 SPECIMEN HANDLING OFFICE-LAB: CPT | Performed by: PATHOLOGY

## 2025-05-05 PROCEDURE — 90471 IMMUNIZATION ADMIN: CPT | Performed by: INTERNAL MEDICINE

## 2025-05-05 PROCEDURE — 80048 BASIC METABOLIC PNL TOTAL CA: CPT | Performed by: PATHOLOGY

## 2025-05-05 PROCEDURE — 83721 ASSAY OF BLOOD LIPOPROTEIN: CPT | Performed by: INTERNAL MEDICINE

## 2025-05-05 PROCEDURE — 80061 LIPID PANEL: CPT | Performed by: PATHOLOGY

## 2025-05-05 PROCEDURE — 84550 ASSAY OF BLOOD/URIC ACID: CPT | Performed by: PATHOLOGY

## 2025-05-05 PROCEDURE — 36415 COLL VENOUS BLD VENIPUNCTURE: CPT | Performed by: PATHOLOGY

## 2025-05-05 PROCEDURE — 90677 PCV20 VACCINE IM: CPT | Performed by: INTERNAL MEDICINE

## 2025-05-05 PROCEDURE — 83036 HEMOGLOBIN GLYCOSYLATED A1C: CPT | Performed by: INTERNAL MEDICINE

## 2025-05-05 PROCEDURE — 99396 PREV VISIT EST AGE 40-64: CPT | Mod: 25 | Performed by: INTERNAL MEDICINE

## 2025-05-05 NOTE — PROGRESS NOTES
"Preventive Care Visit  [facility]  Cait Kaiser MD, Internal Medicine  May 5, 2025      Assessment & Plan     Preventative health care  Reviewed preventive healthcare needs with patient.  Recommend pneumococcal vaccine.  Patient declined COVID-vaccine.  Recommend screening for dyslipidemia as well as diabetes.  - Lipid panel reflex to direct LDL Non-fasting; Future  - REVIEW OF HEALTH MAINTENANCE PROTOCOL ORDERS  - Pneumococcal 20 Valent Conjugate (PCV20)  - Basic metabolic panel  (Ca, Cl, CO2, Creat, Gluc, K, Na, BUN); Future  - Hemoglobin A1c; Future    Hyperlipidemia with target LDL less than 160  Patient discontinued his statin therapy.  Recommend checking lipid panel to determine the need for restarting the medication.  - Lipid panel reflex to direct LDL Non-fasting; Future    Acute idiopathic gout of right ankle  Recommend checking uric acid today.  Patient has not had any episodes of gout since he discontinued the medication.  Will need to review preventive strategies for history of gout once uric acid level is determined.  - Uric acid; Future            BMI  Estimated body mass index is 25.58 kg/m  as calculated from the following:    Height as of this encounter: 1.676 m (5' 5.98\").    Weight as of this encounter: 71.8 kg (158 lb 6.4 oz).       Counseling  Appropriate preventive services were addressed with this patient via screening, questionnaire, or discussion as appropriate for fall prevention, nutrition, physical activity, Tobacco-use cessation, social engagement, weight loss and cognition.  Checklist reviewing preventive services available has been given to the patient.  Reviewed patient's diet, addressing concerns and/or questions.       Subjective   Ricky is a 54 year old, presenting for the following:  Physical        5/5/2025     9:12 AM   Additional Questions   Roomed by Nancy MALDONADO   Accompanied by N/A          HPI     Patient is here for " preventive visit.  He reports that he has stopped all of his medications because he ran out.  He is open to getting blood work done today.  He denies any significant concerns today.  He is hesitant to have COVID-vaccine due to prior reaction.  However, he is open to pneumococcal vaccine.  Advance Care Planning    Document on file is a Health Care Directive or POLST.        5/2/2025   General Health   How would you rate your overall physical health? Good   Feel stress (tense, anxious, or unable to sleep) Only a little   (!) STRESS CONCERN      5/2/2025   Nutrition   Three or more servings of calcium each day? Yes   Diet: Regular (no restrictions)   How many servings of fruit and vegetables per day? (!) 2-3   How many sweetened beverages each day? 0-1         5/2/2025   Exercise   Days per week of moderate/strenous exercise 4 days   Average minutes spent exercising at this level 30 min         5/2/2025   Social Factors   Frequency of gathering with friends or relatives Once a week   Worry food won't last until get money to buy more No   Food not last or not have enough money for food? No   Do you have housing? (Housing is defined as stable permanent housing and does not include staying outside in a car, in a tent, in an abandoned building, in an overnight shelter, or couch-surfing.) Yes   Are you worried about losing your housing? No   Lack of transportation? No   Unable to get utilities (heat,electricity)? No         5/2/2025   Fall Risk   Fallen 2 or more times in the past year? No   Trouble with walking or balance? No          5/2/2025   Dental   Dentist two times every year? Yes         Today's PHQ-2 Score:       5/5/2025     9:11 AM   PHQ-2 ( 1999 Pf[employer])   Q1: Little interest or pleasure in doing things 0   Q2: Feeling down, depressed or hopeless 0   PHQ-2 Score 0    Q1: Little interest or pleasure in doing things Not at all   Q2: Feeling down, depressed or hopeless Not at all   PHQ-2 Score 0        "Patient-reported           5/2/2025   Substance Use   Alcohol more than 3/day or more than 7/wk No   Do you use any other substances recreationally? No     Social History     Tobacco Use    Smoking status: Never    Smokeless tobacco: Never    Tobacco comments:     Pt tried cigarettes \"once or twice years ago\"   Substance Use Topics    Alcohol use: Yes     Comment: occas social    Drug use: No           5/2/2025   STI Screening   New sexual partner(s) since last STI/HIV test? No   Last PSA:   PSA Tumor Marker   Date Value Ref Range Status   02/10/2023 3.26 0.00 - 3.50 ng/mL Final     ASCVD Risk   The 10-year ASCVD risk score (Cher FERRER, et al., 2019) is: 6.9%    Values used to calculate the score:      Age: 54 years      Sex: Male      Is Non- : No      Diabetic: No      Tobacco smoker: No      Systolic Blood Pressure: 127 mmHg      Is BP treated: No      HDL Cholesterol: 37 mg/dL      Total Cholesterol: 193 mg/dL           Reviewed and updated as needed this visit by Provider                    Past Medical History:   Diagnosis Date    Alopecia areata     recurrent    Gout     Hyperlipidemia LDL goal < 160      Past Surgical History:   Procedure Laterality Date    CIRCUMCISION  7/8/2014    Procedure: CIRCUMCISION;  Surgeon: Tomas Francisco MD;  Location: [facility]    COLONOSCOPY  2015    NORMAL, repeat in 2020    COLONOSCOPY N/A 6/19/2024    Procedure: Colonoscopy;  Surgeon: Shamika Bateman MD;  Location:  GI    VASECTOMY              Objective    Exam  /89 (BP Location: Right arm, Patient Position: Sitting, Cuff Size: Adult Regular)   Pulse 68   Temp 97.7  F (36.5  C)   Resp 14   Ht 1.676 m (5' 5.98\")   Wt 71.8 kg (158 lb 6.4 oz)   SpO2 97%   BMI 25.58 kg/m     Estimated body mass index is 25.58 kg/m  as calculated from the following:    Height as of this encounter: 1.676 m (5' 5.98\").    Weight as of this encounter: 71.8 kg (158 lb 6.4 oz).    Physical " Exam  GENERAL: alert and no distress  EYES: Eyes grossly normal to inspection, PERRL and conjunctivae and sclerae normal  NECK: no adenopathy, no asymmetry, masses, or scars  RESP: lungs clear to auscultation - no rales, rhonchi or wheezes  CV: regular rates and rhythm, normal S1 S2, no S3 or S4, and no peripheral edema  ABDOMEN: soft, nontender and bowel sounds normal  MS: no gross musculoskeletal defects noted, no edema        Signed Electronically by: Cait Kaiser MD

## 2025-05-08 ENCOUNTER — RESULTS FOLLOW-UP (OUTPATIENT)
Dept: INTERNAL MEDICINE | Facility: CLINIC | Age: 55
End: 2025-05-08
Payer: COMMERCIAL

## 2025-05-08 DIAGNOSIS — E78.1 HYPERTRIGLYCERIDEMIA: Primary | ICD-10-CM

## 2025-05-08 DIAGNOSIS — M1A.00X0 IDIOPATHIC CHRONIC GOUT WITHOUT TOPHUS, UNSPECIFIED SITE: ICD-10-CM

## 2025-05-08 RX ORDER — ROSUVASTATIN CALCIUM 5 MG/1
5 TABLET, COATED ORAL DAILY
Qty: 30 TABLET | Refills: 4 | Status: SHIPPED | OUTPATIENT
Start: 2025-05-08

## 2025-05-08 RX ORDER — ALLOPURINOL 100 MG/1
100 TABLET ORAL DAILY
Qty: 30 TABLET | Refills: 4 | Status: SHIPPED | OUTPATIENT
Start: 2025-05-08

## 2025-06-02 NOTE — PROGRESS NOTES
"[facility]   Provider Name:  Juana Flores     Credentials:  ANTHONY CARRENO    PATIENT'S NAME: Ricky Varela  PREFERRED NAME: Ricky  PRONOUNS:       MRN: [id number]  : 1970   ACCT. NUMBER:  [id number]  DATE OF SERVICE: 20  START TIME: 8:33  END TIME: 9:25  PREFERRED PHONE: [phone]  May we leave a program related message: Yes  SERVICE MODALITY:  Phone Visit:      Provider verified identity through the following two step process.  Patient provided:  Patient     The patient has been notified of the following:      \"We have found that certain health care needs can be provided without the need for a face to face visit.  This service lets us provide the care you need with a phone conversation.       I will have full access to your [facility] medical record during this entire phone call.   I will be taking notes for your medical record.      Since this is like an office visit, we will bill your insurance company for this service.       There are potential benefits and risks of telephone visits (e.g. limits to patient confidentiality) that differ from in-person visits.?  Confidentiality still applies for telephone services, and nobody will record the visit.  It is important to be in a quiet, private space that is free of distractions (including cell phone or other devices) during the visit.??      If during the course of the call I believe a telephone visit is not appropriate, you will not be charged for this service\"     Consent has been obtained for this service by care team member: Yes     [facility] ADULT Mental Health DIAGNOSTIC ASSESSMENT      Identifying Information:  Patient is a 50 year old, Hmong.  The pronoun use throughout this assessment reflects the patient's chosen pronoun.  Patient was referred for an assessment by self.  Patient attended the session with wife, Jacqui.     Chief Complaint:   The reason for seeking services at this time is: \" needing help with communication and " Patients daughters & fiend (all 3 are on communication consent) would like to ask Dr Castro what Ayla's life expectancy is.  Ayla told them that they can find out, but that she does not want to know the answer.    Please have Dr Castro call Lacey Mitchell at [phone] to discuss.   "connection.\"   The problem(s) began to get worse recently. Patient has attempted to resolve these concerns in the past through talking and stop intentions.    Social/Family History:  Patient reported they grew up in [place], [place].  They were raised by biological parents.  Parents stayed .    The patient describes their cultural background as Hmong.  Cultural influences and impact on patient's life structure, values, norms, and healthcare: Cultural Bias Hmong American.  Patient identified their preferred language to be English. Patient reported they does not need the assistance of an  or other support involved in therapy.     Patient reported had no significant delays in developmental tasks.   Patient's highest education level was Educated. Patient identified the following learning problems: none reported.  Modifications will not be used to assist communication in therapy.  Patient reports they are  able to understand written materials.    Patient reported the following relationship history Dated his wife before marrying.  Patient's current relationship status is  for 13 years.   Patient identified their sexual orientation as heterosexual.  Patient reported having two child(martín) a 13 year old son and 11 year old daughter. Patient identified friends and spouse as part of their support system.  Patient identified the quality of these relationships as good.      Patient's current living/housing situation involves staying in own home/apartment.  They live with kids and they report that housing is stable.     Patient is currently employed full time and reports they are able to function appropriately at work..  Patient reports their finances are obtained through employment.  Patient  Did not identify finances as a current stressor.      Patient reported that they have not been involved with the legal system. Patient denies being on probation / parole / under the jurisdiction of the " court.    Patient's Strengths and Limitations:  Patient identified the following strengths or resources that will help them succeed in treatment: commitment to health and well being, friends / good social support, family support, intelligence and strong social skills. Things that may interfere with the patient's success in treatment include: none identified.     Personal and Family Medical History:   Patient does not report a family history of mental health concerns.  Patient reports family history includes Cancer - colorectal (age of onset: 64) in his father; Diabetes in his brother and mother..     Patient does not report Mental Health Diagnosis or Treatment.      Patient has had a physical exam to rule out medical causes for current symptoms.  Date of last physical exam was within the past year. Client was encouraged to follow up with PCP if symptoms were to develop. The patient has a [facility] Care Provider, who is named Carlos Azevedo.  Patient reports no current medical concerns.  There are not significant appetite / nutritional concerns / weight changes.   Patient does not report a history of head injury / trauma / cognitive impairment.    Patient reports not taking any current medications    Medication Adherence:  Patient reports no medications are Rx.    Patient Allergies:    Allergies   Allergen Reactions     Atorvastatin Muscle Pain (Myalgia)     Ragweeds Other (See Comments)     rhinitis       Medical History:    Past Medical History:   Diagnosis Date     Alopecia areata     recurrent     Hyperlipidemia LDL goal < 160          Current Mental Status Exam:   Appearance:  Not visible, on the phone    Eye Contact:  Not visible, on the phone   Psychomotor:  Not visible, on the phone, - He cried about sadness wife feels hurt       Gait / station:  Not visible, on the phone  Attitude / Demeanor: Cooperative  Interested Friendly Pleasant  Speech      Rate / Production: Normal/ Responsive       Volume:  Normal  volume      Language:  intact  Mood:   Irritable  Sad   Affect:   Appropriate    Thought Content: Clear   Thought Process: Coherent       Associations: No loosening of associations  Insight:   Good   Judgment:  Intact   Orientation:  All  Attention/concentration: Good    Rating Scales:    PHQ9:    PHQ-9 SCORE 12/2/2020   PHQ-9 Total Score MyChart 1 (Minimal depression)   Some encounter information is confidential and restricted. Go to Review Flowsheets activity to see all data.   ;    GAD7:    MARILEE-7 SCORE 12/2/2020   Total Score 4 (minimal anxiety)   Some encounter information is confidential and restricted. Go to Review Flowsheets activity to see all data.     CGI:     First:Considering your total clinical experience with this particular patient population, how severe are the patient's symptoms at this time?: 4 (12/9/2020  1:28 PM)  ;    Most recentNo data recorded    Substance Use:  Patient did not report a family history of substance use concerns; see medical history section for details.  Patient has not received chemical dependency treatment in the past.  Patient has not ever been to detox.      Patient is not currently receiving any chemical dependency treatment. Patient reported the following problems as a result of their substance use: none.    Patient reports using alcohol occassionally times per   and has 1 beers at a time. Patient first started drinking at age younger.  Patient denies using tobacco.  Patient denies using marijuana.  Patient denies using caffeine.  Patient reports using/abusing the following substance(s). Patient reported no other substance use.     CAGE- AID:  No flowsheet data found.    Substance Use: No symptoms    Based on the negative CAGE score and clinical interview there  are not indications of drug or alcohol abuse.    Significant Losses / Trauma / Abuse / Neglect Issues:   Patient did not serve in the .  There are indications or report of significant loss,  trauma, abuse or neglect issues related to: divorce / relational changes by loss of love making.  Concerns for possible neglect are not present.    Safety Assessment:   Current Safety Concerns:  Thomas Suicide Severity Rating Scale (Lifetime/Recent)  Thomas Suicide Severity Rating (Lifetime/Recent) 12/9/2020   1. Wish to be Dead (Lifetime) No   1. Wish to be Dead (Recent) No   2. Non-Specific Active Suicidal Thoughts (Lifetime) No   2. Non-Specific Active Suicidal Thoughts (Recent) No   3. Active Suicidal Ideation with any Methods (Not Plan) Without Intent to Act (Lifetime) No   3. Active Sucidal Ideation with any Methods (Not Plan) Without Intent to Act (Recent) No   4. Active Suicidal Ideation with Some Intent to Act, Without Specific Plan (Lifetime) No   4. Active Suicidal Ideation with Some Intent to Act, Without Specific Plan (Recent) No   5. Active Suicidal Ideation with Specific Plan and Intent (Lifetime) No   5. Active Suicidal Ideation with Specific Plan and Intent (Recent) No   Most Severe Ideation Rating (Lifetime) NA   Frequency (Lifetime) NA   Duration (Lifetime) NA   Controllability (Lifetime) NA   Protective Factors  (Lifetime) NA   Reasons for Ideation (Lifetime) NA   Most Severe Ideation Rating (Past Month) NA   Frequency (Past Month) NA   Duration (Past Month) NA   Controllability (Past Month) NA   Protective Factors (Past Month) NA   Reasons for Ideation (Past Month) NA   Actual Attempt (Lifetime) No   Actual Attempt (Past 3 Months) No   Has subject engaged in non-suicidal self-injurious behavior? (Lifetime) No   Has subject engaged in non-suicidal self-injurious behavior? (Past 3 Months) No   Interrupted Attempts (Lifetime) No   Interrupted Attempts (Past 3 Months) No   Aborted or Self-Interrupted Attempt (Lifetime) No   Aborted or Self-Interrupted Attempt (Past 3 Months) No   Preparatory Acts or Behavior (Lifetime) No   Preparatory Acts or Behavior (Past 3 Months) No   Most Recent Attempt  Actual Lethality Code NA   Most Lethal Attempt Actual Lethality Code NA   Initial/First Attempt Actual Lethality Code NA     Patient denies current homicidal ideation and behaviors.  Patient denies current self-injurious ideation and behaviors.    Patient denied risk behaviors associated with substance use.  Patient denies any high risk behaviors associated with mental health symptoms.  Patient reports the following current concerns for their personal safety: None.  Patient reports there are not  firearms in the house.    History of Safety Concerns:  Patient denied a history of homicidal ideation.     Patient denied a history of personal safety concerns.    Patient denied a history of assaultive behaviors.    Patient denied a history of sexual assault behaviors.     Patient denied a history of risk behaviors associated with substance use.  Patient denies any history of high risk behaviors associated with mental health symptoms.  Patient reports the following protective factors: positive relationships positive family connections, forward/future oriented thinking, dedication to family/friends, regular sleep, regular physical activity, daily obligations, effective problem-solving skills, committment to well-being and positive social skills    Risk Plan:  See Recommendations for Safety and Risk Management Plan    Review of Symptoms per patient report:  Depression: Change in sleep, Lack of interest, Excessive or inappropriate guilt, Low self-worth, Ruminations, Irritability and Feeling sad, down, or depressed  Geno:  No Symptoms  Psychosis: No Symptoms  Anxiety: Nervousness, Irritability and restless and urges  Panic:  No symptoms  Post Traumatic Stress Disorder:  No Symptoms   Eating Disorder: No Symptoms  ADD / ADHD:  No symptoms  Conduct Disorder: No symptoms  Autism Spectrum Disorder: No symptoms  Obsessive Compulsive Disorder: No Symptoms    Patient reports the following compulsive behaviors and treatment history:  none reported.      Diagnostic Criteria:   A. The development of emotional or behavioral symptoms in response to an identifiable stressor(s) occurring within 3 months of the onset of the stressor(s)  B. These symptoms or behaviors are clinically significant, as evidenced by one or both of the following:  C. The stress-related disturbance does not meet criteria for another disorder & is not not an exacerbation of another mental disorder  D. The symptoms do not represent normal bereavement  E. Once the stressor or its consequences have terminated, the symptoms do not persist for more than an additional 6 months       * Adjustment Disorder with Anxiety: The predominant manfestations are symptoms such as nervousness, worry, or jitteriness, or, in children separation anxiety from major attachment figures    Functional Status:  Patient reports the following functional impairments: home life with wife and relationship(s).     WHODAS:   WHODAS 2.0 Total Score 12/2/2020   Total Score MyChart 15   Some encounter information is confidential and restricted. Go to Review Flowsheets activity to see all data.       Clinical Summary:  1. Reason for assessment: family difficulties  .  2. Psychosocial, Cultural and Contextual Factors: different up-bringings culturally from wife is   .  3. Principal DSM5 Diagnoses  (Sustained by DSM5 Criteria Listed Above):   Adjustment Disorders  309.24 (F43.22) With anxiety.  4. Other Diagnoses that is relevant to services: Relational problems  5. Provisional Diagnosis:  Adjustment Disorders  309.24 (F43.22) With anxiety as evidenced by more irritability and anxiety in recent months .  6. Prognosis: Expect Improvement.  7. Likely consequences of symptoms if not treated: unhappy family situttaion  And possible divorce.  8. Client strengths include:  caring, committed to sobriety, educated, employed, good listener, insightful, intelligent, motivated, open to learning, support of family,  friends and providers, supportive and willing to relate to others .     Recommendations:     1. Plan for Safety and Risk Management:   Recommended that patient call 911 or go to the local ED should there be a change in any of these risk factors..          Report to child / adult protection services was NA.     2. Patient's identified cultural concerns will be addressed by discussing differences.     3. Initial Treatment will focus on:    Anxiety - and withdrawling  Relational Problems related to: Conflict or difficulties with partner/spouse.     4. Resources/Service Plan:    services are not indicated.   Modifications to assist communication are not indicated.   Additional disability accommodations are not indicated.      5. Collaboration:   Collaboration / coordination of treatment will be initiated with the following  support professionals: primary care physician.      6.  Referrals:   The following referral(s) will be initiated: none. Next Scheduled Appointment: 2 weeks, after [place].     A Release of Information has been obtained for the following: none.    7. ALEXA:    ALEXA:  Discussed the general effects of drugs and alcohol on health and well-being.     8. Records:   These were not available for review at time of assessment.   Information in this assessment was obtained from the medical record and  provided by patient and and wife who is a good historian.    Patient will have open access to their mental health medical record.      Provider Name/ Credentials:  Juana Flores MA LP  December 16, 2020

## (undated) RX ORDER — FENTANYL CITRATE 50 UG/ML
INJECTION, SOLUTION INTRAMUSCULAR; INTRAVENOUS
Status: DISPENSED
Start: 2024-06-19